# Patient Record
Sex: FEMALE | Race: WHITE
[De-identification: names, ages, dates, MRNs, and addresses within clinical notes are randomized per-mention and may not be internally consistent; named-entity substitution may affect disease eponyms.]

---
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
## 2018-05-23 PROBLEM — Z00.00 ENCOUNTER FOR PREVENTIVE HEALTH EXAMINATION: Status: ACTIVE | Noted: 2018-05-23

## 2018-05-29 ENCOUNTER — LABORATORY RESULT (OUTPATIENT)
Age: 23
End: 2018-05-29

## 2018-05-29 ENCOUNTER — APPOINTMENT (OUTPATIENT)
Dept: PEDIATRICS | Facility: CLINIC | Age: 23
End: 2018-05-29
Payer: COMMERCIAL

## 2018-05-29 ENCOUNTER — RECORD ABSTRACTING (OUTPATIENT)
Age: 23
End: 2018-05-29

## 2018-05-29 VITALS
SYSTOLIC BLOOD PRESSURE: 102 MMHG | RESPIRATION RATE: 18 BRPM | HEART RATE: 78 BPM | DIASTOLIC BLOOD PRESSURE: 68 MMHG | HEIGHT: 65.25 IN | WEIGHT: 136.25 LBS | BODY MASS INDEX: 22.43 KG/M2

## 2018-05-29 DIAGNOSIS — Z00.00 ENCOUNTER FOR GENERAL ADULT MEDICAL EXAMINATION W/OUT ABNORMAL FINDINGS: ICD-10-CM

## 2018-05-29 DIAGNOSIS — R80.9 PROTEINURIA, UNSPECIFIED: ICD-10-CM

## 2018-05-29 DIAGNOSIS — Z82.69 FAMILY HISTORY OF OTHER DISEASES OF THE MUSCULOSKELETAL SYSTEM AND CONNECTIVE TISSUE: ICD-10-CM

## 2018-05-29 DIAGNOSIS — Z87.19 PERSONAL HISTORY OF OTHER DISEASES OF THE DIGESTIVE SYSTEM: ICD-10-CM

## 2018-05-29 DIAGNOSIS — Z87.898 PERSONAL HISTORY OF OTHER SPECIFIED CONDITIONS: ICD-10-CM

## 2018-05-29 LAB
BILIRUB UR QL STRIP: NEGATIVE
CLARITY UR: CLEAR
COLLECTION METHOD: NORMAL
GLUCOSE UR-MCNC: NEGATIVE
HCG UR QL: 0.2 EU/DL
HGB UR QL STRIP.AUTO: NORMAL
KETONES UR-MCNC: NEGATIVE
LEUKOCYTE ESTERASE UR QL STRIP: NEGATIVE
NITRITE UR QL STRIP: NEGATIVE
PH UR STRIP: 6
PROT UR STRIP-MCNC: NEGATIVE
SP GR UR STRIP: <=1.005

## 2018-05-29 PROCEDURE — 99395 PREV VISIT EST AGE 18-39: CPT | Mod: 25

## 2018-05-29 PROCEDURE — 90472 IMMUNIZATION ADMIN EACH ADD: CPT

## 2018-05-29 PROCEDURE — 81003 URINALYSIS AUTO W/O SCOPE: CPT | Mod: QW

## 2018-05-29 PROCEDURE — 92551 PURE TONE HEARING TEST AIR: CPT

## 2018-05-29 PROCEDURE — 96127 BRIEF EMOTIONAL/BEHAV ASSMT: CPT

## 2018-05-29 PROCEDURE — 90471 IMMUNIZATION ADMIN: CPT

## 2018-05-29 PROCEDURE — 90715 TDAP VACCINE 7 YRS/> IM: CPT

## 2018-05-29 PROCEDURE — 90632 HEPA VACCINE ADULT IM: CPT

## 2018-05-29 NOTE — HISTORY OF PRESENT ILLNESS
[Good] : good [Good Dental Hygiene] : Good [Up to Date] : Up to date [No Nutrition Concerns] : nutrition [No Sleep Concerns] : sleep [No Behavior Concerns] : behavior [No School Concerns] : school [No Developmental Concerns] : development [No Elimination Concerns] : elimination [Diverse, Healthy Diet] : her current diet is diverse and healthy [None] : No significant risk factors are identified [Normal] : bleeding has been normal [Regular Cycle Intervals] : have been regular [Regular Duration] : has been regular

## 2018-05-29 NOTE — PHYSICAL EXAM
[General Appearance - Well Developed] : interactive [General Appearance - Well-Appearing] : well appearing [General Appearance - In No Acute Distress] : in no acute distress [Appearance Of Head] : the head was normocephalic [Sclera] : the conjunctiva were normal [Outer Ear] : the ears and nose were normal in appearance [Both Tympanic Membranes Were Examined] : both tympanic membranes were normal [Nasal Cavity] : the nasal mucosa and septum were normal [Examination Of The Oral Cavity] : the teeth, gums, and palate were normal [Oropharynx] : the oropharynx was normal  [Neck Cervical Mass (___cm)] : no neck mass was observed [Respiration, Rhythm And Depth] : normal respiratory rhythm and effort [Auscultation Breath Sounds / Voice Sounds] : clear bilateral breath sounds [Heart Rate And Rhythm] : heart rate and rhythm were normal [Heart Sounds] : normal S1 and S2 [Murmurs] : no murmurs [Bowel Sounds] : normal bowel sounds [Abdomen Soft] : soft [Abdomen Tenderness] : non-tender [Abdominal Distention] : nondistended [Musculoskeletal Exam: Normal Movement Of All Extremities] : normal movements of all extremities [Motor Tone] : muscle strength and tone were normal [No Visual Abnormalities] : no visible abnormailities [Deep Tendon Reflexes (DTR)] : deep tendon reflexes were 2+ and symmetric [Generalized Lymph Node Enlargement] : no lymphadenopathy [Skin Color & Pigmentation] : normal skin color and pigmentation [] : no significant rash [Skin Lesions] : no skin lesions [Initial Inspection: Infant Active And Alert] : active and alert [External Female Genitalia] : normal external genitalia

## 2018-05-30 LAB
ALBUMIN SERPL ELPH-MCNC: 4.4 G/DL
ALP BLD-CCNC: 61 U/L
ALT SERPL-CCNC: 13 U/L
ANION GAP SERPL CALC-SCNC: 12 MMOL/L
AST SERPL-CCNC: 17 U/L
BASOPHILS # BLD AUTO: 0.01 K/UL
BASOPHILS NFR BLD AUTO: 0.3 %
BILIRUB SERPL-MCNC: 0.4 MG/DL
BUN SERPL-MCNC: 10 MG/DL
CALCIUM SERPL-MCNC: 9.2 MG/DL
CHLORIDE SERPL-SCNC: 104 MMOL/L
CO2 SERPL-SCNC: 26 MMOL/L
CREAT SERPL-MCNC: 0.79 MG/DL
EOSINOPHIL # BLD AUTO: 0.09 K/UL
EOSINOPHIL NFR BLD AUTO: 2.3 %
GLUCOSE SERPL-MCNC: 84 MG/DL
HBA1C MFR BLD HPLC: 5 %
HCT VFR BLD CALC: 37 %
HGB BLD-MCNC: 12.6 G/DL
IMM GRANULOCYTES NFR BLD AUTO: 0 %
IRON SATN MFR SERPL: 26 %
IRON SERPL-MCNC: 84 UG/DL
LYMPHOCYTES # BLD AUTO: 1.28 K/UL
LYMPHOCYTES NFR BLD AUTO: 32.4 %
MAN DIFF?: NORMAL
MCHC RBC-ENTMCNC: 31.8 PG
MCHC RBC-ENTMCNC: 34.1 GM/DL
MCV RBC AUTO: 93.4 FL
MONOCYTES # BLD AUTO: 0.44 K/UL
MONOCYTES NFR BLD AUTO: 11.1 %
NEUTROPHILS # BLD AUTO: 2.13 K/UL
NEUTROPHILS NFR BLD AUTO: 53.9 %
PLATELET # BLD AUTO: 228 K/UL
POTASSIUM SERPL-SCNC: 4.5 MMOL/L
PROT SERPL-MCNC: 6.5 G/DL
RBC # BLD: 3.96 M/UL
RBC # FLD: 12.9 %
SODIUM SERPL-SCNC: 142 MMOL/L
T4 SERPL-MCNC: 6.6 UG/DL
TIBC SERPL-MCNC: 325 UG/DL
UIBC SERPL-MCNC: 241 UG/DL
VZV AB TITR SER: POSITIVE
VZV IGG SER IF-ACNC: 484.9 INDEX
WBC # FLD AUTO: 3.95 K/UL

## 2018-05-30 NOTE — DISCUSSION/SUMMARY
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

## 2018-05-30 NOTE — COUNSELING
[FreeTextEntry1] : Counseling: Alcohol and drugs:. Bike helmet:. Breast self exam after periods:. Communication to patient: Counseling for nutrition provided Yes, Counseling for physical activity provided Yes, Counseled the Patient on tobacco use; cessation provided  Diet:. Exercise:. Injury prevention:. Seatbelts:. Smoking:. Sunscreen\par Health Promotion: Oral health brush teeth - small amount of flouride toothpaste, dental appointment. Sexual health sexual development, change in body structure , safe sex , sexually transmitted disease education , use of condoms. Smoke exposure. Personal hygiene. Skin/UV protection. \par Injury Prevention/Safety: Bike safety. Car restraints and seat belts. Drugs/alcohol/tobacco. \par Nutrition Counseling: Healthy food choices no forced foods , family meals as often as possible, limit or eliminate junk food, adequate calcium , adequate iron containing foods. Weight management. \par \par Social/Behavioral Counseling: Praise good behavior. Relationships caution about peer pressure , advised to cultivate safe relationships , how to say no . TV/game time limit and control TV and game time . Violence Prevention: Gun Safety. Gang Membership and Violence. \par \par Vaccine information sheet given- Date on VIS verified. \par

## 2018-05-30 NOTE — DEVELOPMENTAL MILESTONES
[Eats meals with family] : eats meals with family [Has famliy member/adult to turn to for help] : has family member/adult to turn to for help [Is permitted and is able to make independent decisions] : is permitted and is able to make independent decisions [Mother] : mother [Brother] : brother [Sister] : sister [NL] : normal [Has friends] : has friends [At least 1 hour of physical acitvity/day] : at least 1 hour of physical activity/day [Sexually Active] : The patient is sexually active [[id number]] : 2 [Uses tobacco/alcohol/drugs] : does not use tobacco/alcohol/drugs [FreeTextEntry6] : third year of college

## 2022-08-05 ENCOUNTER — APPOINTMENT (OUTPATIENT)
Dept: ORTHOPEDIC SURGERY | Facility: CLINIC | Age: 27
End: 2022-08-05

## 2022-08-05 VITALS — HEIGHT: 65 IN | WEIGHT: 155 LBS | BODY MASS INDEX: 25.83 KG/M2

## 2022-08-05 DIAGNOSIS — M25.562 PAIN IN LEFT KNEE: ICD-10-CM

## 2022-08-05 DIAGNOSIS — M70.52 OTHER BURSITIS OF KNEE, LEFT KNEE: ICD-10-CM

## 2022-08-05 DIAGNOSIS — S83.92XA SPRAIN OF UNSPECIFIED SITE OF LEFT KNEE, INITIAL ENCOUNTER: ICD-10-CM

## 2022-08-05 PROCEDURE — 73564 X-RAY EXAM KNEE 4 OR MORE: CPT | Mod: LT

## 2022-08-05 PROCEDURE — 99203 OFFICE O/P NEW LOW 30 MIN: CPT

## 2022-08-05 NOTE — HISTORY OF PRESENT ILLNESS
[7] : 7 [2] : 2 [Dull/Aching] : dull/aching [Sharp] : sharp [de-identified] : Left knee pain after running and stepping off curb poorly. The day after she pivoted the knee, knee buckled and c/o of stiffness after.  c/o of rubbing and grinding pain, difficulty ascending pain. Tried Advil, prior swelling. Denies hx of left knee pain. c/o of locking symptom.Has GI ulcers but tried advil [FreeTextEntry1] : left knee.

## 2022-08-05 NOTE — DISCUSSION/SUMMARY
[de-identified] : ice\par restrict activities\par stop using advil\par  \par \par RE:  RENNY BEAVER \par \par Acct #- [id number] \par \par \par \par Attention:  Nurse Reviewer /Medical Director\par \par  \par Based on my patient's condition, I strongly believe that the MRI is medically.necessary.  \par The patient has failed oral meds, injections and PT and conservative treatment in combination or by themselves and therefore needs the MRI.  \par The MRI will dictate further treatment t recommendations.\par

## 2022-08-05 NOTE — PHYSICAL EXAM
[Positive] : positive Chau [Left] : left knee [All Views] : anteroposterior, lateral, skyline, and anteroposterior standing [] : no calf tenderness [5___] : quadriceps 5[unfilled]/5 [There are no fractures, subluxations or dislocations. No significant abnormalities are seen] : There are no fractures, subluxations or dislocations. No significant abnormalities are seen [TWNoteComboBox7] : flexion 130 degrees [de-identified] : extension 0 degrees

## 2022-08-26 ENCOUNTER — APPOINTMENT (OUTPATIENT)
Dept: ORTHOPEDIC SURGERY | Facility: CLINIC | Age: 27
End: 2022-08-26

## 2023-01-06 ENCOUNTER — NON-APPOINTMENT (OUTPATIENT)
Age: 28
End: 2023-01-06

## 2023-01-09 ENCOUNTER — APPOINTMENT (OUTPATIENT)
Dept: OBGYN | Facility: CLINIC | Age: 28
End: 2023-01-09
Payer: COMMERCIAL

## 2023-01-09 VITALS
WEIGHT: 163 LBS | BODY MASS INDEX: 27.16 KG/M2 | SYSTOLIC BLOOD PRESSURE: 118 MMHG | HEIGHT: 65 IN | DIASTOLIC BLOOD PRESSURE: 70 MMHG

## 2023-01-09 DIAGNOSIS — Z78.9 OTHER SPECIFIED HEALTH STATUS: ICD-10-CM

## 2023-01-09 DIAGNOSIS — N92.0 EXCESSIVE AND FREQUENT MENSTRUATION WITH REGULAR CYCLE: ICD-10-CM

## 2023-01-09 DIAGNOSIS — Z01.411 ENCOUNTER FOR GYNECOLOGICAL EXAMINATION (GENERAL) (ROUTINE) WITH ABNORMAL FINDINGS: ICD-10-CM

## 2023-01-09 DIAGNOSIS — K27.9 PEPTIC ULCER, SITE UNSPECIFIED, UNSPECIFIED AS ACUTE OR CHRONIC, W/OUT HEMORRHAGE OR PERFORATION: ICD-10-CM

## 2023-01-09 PROCEDURE — 99385 PREV VISIT NEW AGE 18-39: CPT

## 2023-01-09 RX ORDER — CYCLOBENZAPRINE HYDROCHLORIDE 5 MG/1
5 TABLET, FILM COATED ORAL
Qty: 21 | Refills: 0 | Status: DISCONTINUED | COMMUNITY
Start: 2022-07-13 | End: 2023-01-09

## 2023-01-09 RX ORDER — OMEPRAZOLE 40 MG/1
40 CAPSULE, DELAYED RELEASE ORAL
Qty: 42 | Refills: 0 | Status: ACTIVE | COMMUNITY
Start: 2022-12-08

## 2023-01-09 NOTE — HISTORY OF PRESENT ILLNESS
[N] : Patient does not use contraception [Y] : Patient is sexually active [Menarche Age: ____] : age at menarche was [unfilled] [Frequency: Q ___ days] : menstrual periods occur approximately every [unfilled] days [LMPDate] : 12/27/22 [PGHxTotal] : 0 [PGHxFullTerm] : 0 [PGHxPremature] : 0 [PGHxAbortions] : 0 [[facility]] : 0 [PGHxABInduced] : 0 [PGHxABSpont] : 0 [PGHxEctopic] : 0 [PGHxMultBirths] : 0 [FreeTextEntry1] : 12/27/22

## 2023-01-09 NOTE — COUNSELING
[Nutrition/ Exercise/ Weight Management] : nutrition, exercise, weight management [Body Image] : body image [Breast Self Exam] : breast self exam [Bladder Hygiene] : bladder hygiene [Vitamins/Supplements] : vitamins/supplements

## 2023-01-09 NOTE — PLAN
[FreeTextEntry1] : Encounter for GYN exam \par \par - Pap obtained \par - Further STD testing offered and declined \par \par Menorrhagia \par \par - Labs obtained \par - US ordered \par  \par Will call patient with results \par All questions and concerns addressed during encounter. Pt. agreed to plan of care. \par F.U in 1 year or PRN

## 2023-01-10 ENCOUNTER — APPOINTMENT (OUTPATIENT)
Dept: ULTRASOUND IMAGING | Facility: CLINIC | Age: 28
End: 2023-01-10
Payer: COMMERCIAL

## 2023-01-10 ENCOUNTER — OUTPATIENT (OUTPATIENT)
Dept: OUTPATIENT SERVICES | Facility: HOSPITAL | Age: 28
LOS: 1 days | End: 2023-01-10
Payer: COMMERCIAL

## 2023-01-10 DIAGNOSIS — N92.0 EXCESSIVE AND FREQUENT MENSTRUATION WITH REGULAR CYCLE: ICD-10-CM

## 2023-01-10 LAB
DHEA-S SERPL-MCNC: 259 UG/DL
ESTIMATED AVERAGE GLUCOSE: 103 MG/DL
ESTRADIOL SERPL-MCNC: 82 PG/ML
FSH SERPL-MCNC: 7.4 IU/L
HBA1C MFR BLD HPLC: 5.2 %
HCG-TM SERPL-MCNC: <1 MIU/ML
HPV HIGH+LOW RISK DNA PNL CVX: NOT DETECTED
LH SERPL-ACNC: 24.5 IU/L
PROLACTIN SERPL-MCNC: 30.6 NG/ML
TSH SERPL-ACNC: 1.35 UIU/ML

## 2023-01-10 PROCEDURE — 76856 US EXAM PELVIC COMPLETE: CPT

## 2023-01-10 PROCEDURE — 76830 TRANSVAGINAL US NON-OB: CPT | Mod: 26

## 2023-01-10 PROCEDURE — 76830 TRANSVAGINAL US NON-OB: CPT

## 2023-01-10 PROCEDURE — 76856 US EXAM PELVIC COMPLETE: CPT | Mod: 26

## 2023-01-11 ENCOUNTER — TRANSCRIPTION ENCOUNTER (OUTPATIENT)
Age: 28
End: 2023-01-11

## 2023-01-16 LAB
17OHP SERPL-MCNC: 75 NG/DL
TESTOST FREE SERPL-MCNC: 2.6 PG/ML
TESTOST SERPL-MCNC: 26.6 NG/DL

## 2023-01-16 RX ORDER — NORETHINDRONE ACETATE AND ETHINYL ESTRADIOL AND FERROUS FUMARATE 1MG-20(21)
1-20 KIT ORAL DAILY
Qty: 3 | Refills: 1 | Status: ACTIVE | COMMUNITY
Start: 2023-01-16 | End: 1900-01-01

## 2023-01-20 LAB — CYTOLOGY CVX/VAG DOC THIN PREP: ABNORMAL

## 2023-01-24 ENCOUNTER — APPOINTMENT (OUTPATIENT)
Dept: OBGYN | Facility: CLINIC | Age: 28
End: 2023-01-24

## 2023-10-09 ENCOUNTER — APPOINTMENT (OUTPATIENT)
Dept: HUMAN REPRODUCTION | Facility: CLINIC | Age: 28
End: 2023-10-09

## 2023-10-09 ENCOUNTER — APPOINTMENT (OUTPATIENT)
Dept: HUMAN REPRODUCTION | Facility: CLINIC | Age: 28
End: 2023-10-09
Payer: COMMERCIAL

## 2023-10-09 PROCEDURE — 36415 COLL VENOUS BLD VENIPUNCTURE: CPT

## 2023-10-09 PROCEDURE — 76830 TRANSVAGINAL US NON-OB: CPT

## 2023-10-09 PROCEDURE — 99205 OFFICE O/P NEW HI 60 MIN: CPT | Mod: 25

## 2023-12-08 ENCOUNTER — APPOINTMENT (OUTPATIENT)
Dept: HUMAN REPRODUCTION | Facility: CLINIC | Age: 28
End: 2023-12-08
Payer: COMMERCIAL

## 2023-12-08 PROCEDURE — 36415 COLL VENOUS BLD VENIPUNCTURE: CPT

## 2023-12-11 ENCOUNTER — APPOINTMENT (OUTPATIENT)
Dept: HUMAN REPRODUCTION | Facility: CLINIC | Age: 28
End: 2023-12-11
Payer: COMMERCIAL

## 2023-12-11 PROCEDURE — 58340 CATHETER FOR HYSTEROGRAPHY: CPT

## 2023-12-11 PROCEDURE — 76831 ECHO EXAM UTERUS: CPT

## 2023-12-11 PROCEDURE — 58999I: CUSTOM

## 2023-12-11 PROCEDURE — 99214 OFFICE O/P EST MOD 30 MIN: CPT | Mod: 25

## 2023-12-29 ENCOUNTER — APPOINTMENT (OUTPATIENT)
Dept: HUMAN REPRODUCTION | Facility: CLINIC | Age: 28
End: 2023-12-29

## 2024-01-04 ENCOUNTER — APPOINTMENT (OUTPATIENT)
Dept: HUMAN REPRODUCTION | Facility: CLINIC | Age: 29
End: 2024-01-04
Payer: COMMERCIAL

## 2024-01-04 PROCEDURE — 99214 OFFICE O/P EST MOD 30 MIN: CPT | Mod: 95

## 2024-01-08 ENCOUNTER — APPOINTMENT (OUTPATIENT)
Dept: HUMAN REPRODUCTION | Facility: CLINIC | Age: 29
End: 2024-01-08

## 2024-01-18 ENCOUNTER — APPOINTMENT (OUTPATIENT)
Dept: HUMAN REPRODUCTION | Facility: CLINIC | Age: 29
End: 2024-01-18
Payer: COMMERCIAL

## 2024-01-18 PROCEDURE — 36415 COLL VENOUS BLD VENIPUNCTURE: CPT

## 2024-01-18 PROCEDURE — 76830 TRANSVAGINAL US NON-OB: CPT

## 2024-01-18 PROCEDURE — 99213 OFFICE O/P EST LOW 20 MIN: CPT | Mod: 25

## 2024-01-31 ENCOUNTER — APPOINTMENT (OUTPATIENT)
Dept: HUMAN REPRODUCTION | Facility: CLINIC | Age: 29
End: 2024-01-31
Payer: COMMERCIAL

## 2024-01-31 PROCEDURE — 99213 OFFICE O/P EST LOW 20 MIN: CPT | Mod: 25

## 2024-01-31 PROCEDURE — 76830 TRANSVAGINAL US NON-OB: CPT

## 2024-01-31 PROCEDURE — 36415 COLL VENOUS BLD VENIPUNCTURE: CPT

## 2024-01-31 PROCEDURE — S4042: CPT

## 2024-02-03 ENCOUNTER — APPOINTMENT (OUTPATIENT)
Dept: HUMAN REPRODUCTION | Facility: CLINIC | Age: 29
End: 2024-02-03
Payer: COMMERCIAL

## 2024-02-03 PROCEDURE — 99213 OFFICE O/P EST LOW 20 MIN: CPT | Mod: 25

## 2024-02-03 PROCEDURE — 76857 US EXAM PELVIC LIMITED: CPT

## 2024-02-03 PROCEDURE — 36415 COLL VENOUS BLD VENIPUNCTURE: CPT

## 2024-02-05 ENCOUNTER — APPOINTMENT (OUTPATIENT)
Dept: HUMAN REPRODUCTION | Facility: CLINIC | Age: 29
End: 2024-02-05
Payer: COMMERCIAL

## 2024-02-05 PROCEDURE — 36415 COLL VENOUS BLD VENIPUNCTURE: CPT

## 2024-02-05 PROCEDURE — 76857 US EXAM PELVIC LIMITED: CPT

## 2024-02-05 PROCEDURE — 99213 OFFICE O/P EST LOW 20 MIN: CPT | Mod: 25

## 2024-02-07 ENCOUNTER — APPOINTMENT (OUTPATIENT)
Dept: HUMAN REPRODUCTION | Facility: CLINIC | Age: 29
End: 2024-02-07
Payer: COMMERCIAL

## 2024-02-07 PROCEDURE — 99213 OFFICE O/P EST LOW 20 MIN: CPT | Mod: 25

## 2024-02-07 PROCEDURE — 76857 US EXAM PELVIC LIMITED: CPT

## 2024-02-07 PROCEDURE — 36415 COLL VENOUS BLD VENIPUNCTURE: CPT

## 2024-02-09 ENCOUNTER — APPOINTMENT (OUTPATIENT)
Dept: HUMAN REPRODUCTION | Facility: CLINIC | Age: 29
End: 2024-02-09
Payer: COMMERCIAL

## 2024-02-09 PROCEDURE — 36415 COLL VENOUS BLD VENIPUNCTURE: CPT

## 2024-02-09 PROCEDURE — 99213 OFFICE O/P EST LOW 20 MIN: CPT | Mod: 25

## 2024-02-09 PROCEDURE — 76857 US EXAM PELVIC LIMITED: CPT

## 2024-02-10 ENCOUNTER — APPOINTMENT (OUTPATIENT)
Dept: HUMAN REPRODUCTION | Facility: CLINIC | Age: 29
End: 2024-02-10
Payer: COMMERCIAL

## 2024-02-10 PROCEDURE — 99213 OFFICE O/P EST LOW 20 MIN: CPT | Mod: 25

## 2024-02-10 PROCEDURE — 36415 COLL VENOUS BLD VENIPUNCTURE: CPT

## 2024-02-10 PROCEDURE — 76857 US EXAM PELVIC LIMITED: CPT

## 2024-02-11 ENCOUNTER — APPOINTMENT (OUTPATIENT)
Dept: HUMAN REPRODUCTION | Facility: CLINIC | Age: 29
End: 2024-02-11
Payer: COMMERCIAL

## 2024-02-11 PROCEDURE — 99213 OFFICE O/P EST LOW 20 MIN: CPT | Mod: 25

## 2024-02-11 PROCEDURE — 76857 US EXAM PELVIC LIMITED: CPT

## 2024-02-11 PROCEDURE — 36415 COLL VENOUS BLD VENIPUNCTURE: CPT

## 2024-02-12 ENCOUNTER — APPOINTMENT (OUTPATIENT)
Dept: HUMAN REPRODUCTION | Facility: CLINIC | Age: 29
End: 2024-02-12
Payer: COMMERCIAL

## 2024-02-12 PROCEDURE — 36415 COLL VENOUS BLD VENIPUNCTURE: CPT

## 2024-02-13 ENCOUNTER — APPOINTMENT (OUTPATIENT)
Dept: HUMAN REPRODUCTION | Facility: CLINIC | Age: 29
End: 2024-02-13
Payer: COMMERCIAL

## 2024-02-13 PROCEDURE — 89260 SPERM ISOLATION SIMPLE: CPT

## 2024-02-13 PROCEDURE — 76948 ECHO GUIDE OVA ASPIRATION: CPT

## 2024-02-13 PROCEDURE — 89281 ASSIST OOCYTE FERTILIZATION: CPT

## 2024-02-13 PROCEDURE — 58970 RETRIEVAL OF OOCYTE: CPT

## 2024-02-13 PROCEDURE — 89250 CULTR OOCYTE/EMBRYO <4 DAYS: CPT

## 2024-02-13 PROCEDURE — 89353 THAWING CRYOPRESRVED SPERM: CPT

## 2024-02-13 PROCEDURE — 89254 OOCYTE IDENTIFICATION: CPT

## 2024-02-14 ENCOUNTER — APPOINTMENT (OUTPATIENT)
Dept: HUMAN REPRODUCTION | Facility: CLINIC | Age: 29
End: 2024-02-14
Payer: COMMERCIAL

## 2024-02-16 PROCEDURE — 89253 EMBRYO HATCHING: CPT

## 2024-02-18 PROCEDURE — 89342 STORAGE/YEAR EMBRYO(S): CPT

## 2024-02-18 PROCEDURE — 89258 CRYOPRESERVATION EMBRYO(S): CPT

## 2024-02-18 PROCEDURE — 89290 BIOPSY OOCYTE POLAR BODY <=5: CPT

## 2024-02-18 PROCEDURE — 89272 EXTENDED CULTURE OF OOCYTES: CPT

## 2024-02-19 PROCEDURE — 89258 CRYOPRESERVATION EMBRYO(S): CPT

## 2024-02-26 ENCOUNTER — APPOINTMENT (OUTPATIENT)
Dept: HUMAN REPRODUCTION | Facility: CLINIC | Age: 29
End: 2024-02-26
Payer: COMMERCIAL

## 2024-02-26 PROCEDURE — 76830 TRANSVAGINAL US NON-OB: CPT

## 2024-02-26 PROCEDURE — 36415 COLL VENOUS BLD VENIPUNCTURE: CPT

## 2024-02-26 PROCEDURE — 99213 OFFICE O/P EST LOW 20 MIN: CPT | Mod: 25

## 2024-03-12 ENCOUNTER — APPOINTMENT (OUTPATIENT)
Dept: HUMAN REPRODUCTION | Facility: CLINIC | Age: 29
End: 2024-03-12
Payer: COMMERCIAL

## 2024-03-12 PROCEDURE — 99213 OFFICE O/P EST LOW 20 MIN: CPT | Mod: 25

## 2024-03-12 PROCEDURE — 36415 COLL VENOUS BLD VENIPUNCTURE: CPT

## 2024-03-12 PROCEDURE — 76830 TRANSVAGINAL US NON-OB: CPT

## 2024-03-12 PROCEDURE — S4042: CPT

## 2024-03-21 ENCOUNTER — APPOINTMENT (OUTPATIENT)
Dept: HUMAN REPRODUCTION | Facility: CLINIC | Age: 29
End: 2024-03-21
Payer: COMMERCIAL

## 2024-03-21 PROCEDURE — 36415 COLL VENOUS BLD VENIPUNCTURE: CPT

## 2024-03-21 PROCEDURE — 99213 OFFICE O/P EST LOW 20 MIN: CPT | Mod: 25

## 2024-03-21 PROCEDURE — 76857 US EXAM PELVIC LIMITED: CPT

## 2024-03-28 ENCOUNTER — APPOINTMENT (OUTPATIENT)
Dept: HUMAN REPRODUCTION | Facility: CLINIC | Age: 29
End: 2024-03-28
Payer: COMMERCIAL

## 2024-03-28 PROCEDURE — 36415 COLL VENOUS BLD VENIPUNCTURE: CPT

## 2024-03-28 PROCEDURE — 99213 OFFICE O/P EST LOW 20 MIN: CPT | Mod: 25

## 2024-03-28 PROCEDURE — 76857 US EXAM PELVIC LIMITED: CPT

## 2024-04-03 ENCOUNTER — APPOINTMENT (OUTPATIENT)
Dept: HUMAN REPRODUCTION | Facility: CLINIC | Age: 29
End: 2024-04-03
Payer: COMMERCIAL

## 2024-04-03 PROCEDURE — 58974 EMBRYO TRANSFER INTRAUTERINE: CPT

## 2024-04-03 PROCEDURE — 89352 THAWING CRYOPRESRVED EMBRYO: CPT

## 2024-04-03 PROCEDURE — 76998 US GUIDE INTRAOP: CPT

## 2024-04-03 PROCEDURE — 89398A: CUSTOM

## 2024-04-03 PROCEDURE — 89255 PREPARE EMBRYO FOR TRANSFER: CPT

## 2024-04-04 ENCOUNTER — APPOINTMENT (OUTPATIENT)
Dept: HUMAN REPRODUCTION | Facility: CLINIC | Age: 29
End: 2024-04-04
Payer: COMMERCIAL

## 2024-04-15 ENCOUNTER — APPOINTMENT (OUTPATIENT)
Dept: HUMAN REPRODUCTION | Facility: CLINIC | Age: 29
End: 2024-04-15
Payer: COMMERCIAL

## 2024-04-15 PROCEDURE — 36415 COLL VENOUS BLD VENIPUNCTURE: CPT

## 2024-04-17 ENCOUNTER — APPOINTMENT (OUTPATIENT)
Dept: HUMAN REPRODUCTION | Facility: CLINIC | Age: 29
End: 2024-04-17
Payer: COMMERCIAL

## 2024-04-17 PROCEDURE — 36415 COLL VENOUS BLD VENIPUNCTURE: CPT

## 2024-04-22 ENCOUNTER — APPOINTMENT (OUTPATIENT)
Dept: HUMAN REPRODUCTION | Facility: CLINIC | Age: 29
End: 2024-04-22
Payer: COMMERCIAL

## 2024-04-22 PROCEDURE — 76817 TRANSVAGINAL US OBSTETRIC: CPT

## 2024-04-22 PROCEDURE — 99459 PELVIC EXAMINATION: CPT | Mod: 25

## 2024-04-22 PROCEDURE — 99213 OFFICE O/P EST LOW 20 MIN: CPT | Mod: 25

## 2024-04-22 PROCEDURE — 36415 COLL VENOUS BLD VENIPUNCTURE: CPT

## 2024-05-03 ENCOUNTER — APPOINTMENT (OUTPATIENT)
Dept: HUMAN REPRODUCTION | Facility: CLINIC | Age: 29
End: 2024-05-03
Payer: COMMERCIAL

## 2024-05-03 PROCEDURE — 76817 TRANSVAGINAL US OBSTETRIC: CPT

## 2024-05-03 PROCEDURE — 99459 PELVIC EXAMINATION: CPT

## 2024-05-03 PROCEDURE — 36415 COLL VENOUS BLD VENIPUNCTURE: CPT

## 2024-05-03 PROCEDURE — 99213 OFFICE O/P EST LOW 20 MIN: CPT | Mod: 25

## 2024-05-15 DIAGNOSIS — Z13.71 ENCOUNTER FOR NONPROCREATIVE SCREENING FOR GENETIC DISEASE CARRIER STATUS: ICD-10-CM

## 2024-05-23 ENCOUNTER — NON-APPOINTMENT (OUTPATIENT)
Age: 29
End: 2024-05-23

## 2024-05-23 ENCOUNTER — LABORATORY RESULT (OUTPATIENT)
Age: 29
End: 2024-05-23

## 2024-05-24 ENCOUNTER — APPOINTMENT (OUTPATIENT)
Dept: OBGYN | Facility: CLINIC | Age: 29
End: 2024-05-24
Payer: COMMERCIAL

## 2024-05-24 VITALS
DIASTOLIC BLOOD PRESSURE: 70 MMHG | HEIGHT: 65 IN | SYSTOLIC BLOOD PRESSURE: 108 MMHG | WEIGHT: 171 LBS | BODY MASS INDEX: 28.49 KG/M2

## 2024-05-24 DIAGNOSIS — Z34.91 ENCOUNTER FOR SUPERVISION OF NORMAL PREGNANCY, UNSPECIFIED, FIRST TRIMESTER: ICD-10-CM

## 2024-05-24 LAB
APPEARANCE: CLEAR
BILIRUBIN URINE: NEGATIVE
BLOOD URINE: ABNORMAL
COLOR: YELLOW
GLUCOSE QUALITATIVE U: NEGATIVE
KETONES URINE: NEGATIVE
LEUKOCYTE ESTERASE URINE: ABNORMAL
NITRITE URINE: NEGATIVE
PH URINE: 6
PROTEIN URINE: NEGATIVE
SPECIFIC GRAVITY URINE: 1.01
UROBILINOGEN URINE: 0.2 (ref 0.2–?)

## 2024-05-24 PROCEDURE — 0500F INITIAL PRENATAL CARE VISIT: CPT

## 2024-05-24 PROCEDURE — 99459 PELVIC EXAMINATION: CPT

## 2024-05-24 PROCEDURE — 99203 OFFICE O/P NEW LOW 30 MIN: CPT

## 2024-05-28 PROBLEM — Z34.91 FIRST TRIMESTER PREGNANCY: Status: ACTIVE | Noted: 2024-05-15

## 2024-05-28 LAB
ABO + RH PNL BLD: NORMAL
ALBUMIN SERPL ELPH-MCNC: 4.2 G/DL
ALP BLD-CCNC: 65 U/L
ALT SERPL-CCNC: 11 U/L
ANION GAP SERPL CALC-SCNC: 20 MMOL/L
APPEARANCE: CLEAR
AST SERPL-CCNC: 14 U/L
BILIRUB SERPL-MCNC: 0.2 MG/DL
BILIRUBIN URINE: NEGATIVE
BLD GP AB SCN SERPL QL: NORMAL
BLOOD URINE: NEGATIVE
BUN SERPL-MCNC: 10 MG/DL
CALCIUM SERPL-MCNC: 9.3 MG/DL
CHLORIDE SERPL-SCNC: 98 MMOL/L
CMV IGG SERPL QL: <0.2 U/ML
CMV IGG SERPL-IMP: NEGATIVE
CMV IGM SERPL QL: <8 AU/ML
CMV IGM SERPL QL: NEGATIVE
CO2 SERPL-SCNC: 20 MMOL/L
COLOR: YELLOW
CREAT SERPL-MCNC: 0.66 MG/DL
EGFR: 124 ML/MIN/1.73M2
ESTIMATED AVERAGE GLUCOSE: 103 MG/DL
GLUCOSE QUALITATIVE U: NEGATIVE MG/DL
GLUCOSE SERPL-MCNC: 25 MG/DL
HBA1C MFR BLD HPLC: 5.2 %
HBV SURFACE AG SER QL: NONREACTIVE
HCT VFR BLD CALC: 40.5 %
HCV AB SER QL: NONREACTIVE
HCV S/CO RATIO: 0.07 S/CO
HGB A MFR BLD: 97.4 %
HGB A2 MFR BLD: 2.6 %
HGB BLD-MCNC: 13.9 G/DL
HGB FRACT BLD-IMP: NORMAL
HIV1+2 AB SPEC QL IA.RAPID: NONREACTIVE
KETONES URINE: NEGATIVE MG/DL
LEUKOCYTE ESTERASE URINE: ABNORMAL
MCHC RBC-ENTMCNC: 32.4 PG
MCHC RBC-ENTMCNC: 34.3 GM/DL
MCV RBC AUTO: 94.4 FL
MEV IGG FLD QL IA: >300 AU/ML
MEV IGG+IGM SER-IMP: POSITIVE
MUV AB SER-ACNC: POSITIVE
MUV IGG SER QL IA: >300 AU/ML
NITRITE URINE: NEGATIVE
PH URINE: 6.5
PLATELET # BLD AUTO: 222 K/UL
POTASSIUM SERPL-SCNC: 3.7 MMOL/L
PROT SERPL-MCNC: 6.3 G/DL
PROTEIN URINE: NEGATIVE MG/DL
RBC # BLD: 4.29 M/UL
RBC # FLD: 12.4 %
RUBV IGG FLD-ACNC: 6.1 INDEX
RUBV IGG SER-IMP: POSITIVE
SODIUM SERPL-SCNC: 138 MMOL/L
SPECIFIC GRAVITY URINE: 1.01
T GONDII AB SER-IMP: NEGATIVE
T GONDII AB SER-IMP: NEGATIVE
T GONDII IGG SER QL: <3 IU/ML
T GONDII IGM SER QL: <3 AU/ML
T PALLIDUM AB SER QL IA: NEGATIVE
TSH SERPL-ACNC: 1.21 UIU/ML
UROBILINOGEN URINE: 0.2 MG/DL
VZV AB TITR SER: POSITIVE
VZV IGG SER IF-ACNC: 713.5 INDEX
WBC # FLD AUTO: 9.57 K/UL

## 2024-05-28 NOTE — PHYSICAL EXAM
[Chaperone Present] : A chaperone was present in the examining room during all aspects of the physical examination [[id number]] : A chaperone was present during the pelvic exam. [Appropriately responsive] : appropriately responsive [Alert] : alert [No Acute Distress] : no acute distress [Soft] : soft [No Lesions] : no lesions

## 2024-05-28 NOTE — HISTORY OF PRESENT ILLNESS
[FreeTextEntry1] : 28 y.o  (LMP 3/15/24) presenting to [facility] OB care  Patient transferring care from [facility] (Dr. Sanchez).  - Underwent FET on 4/3/24, US On  measured 5w3d - Anonymous sperm donor- negative genetic testing. 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 Patient with Negative carrier screening - Currently in same-sex relationship with female partner. Most recent STD screening 10/2023-negative - Denies any vaginal bleeding or pelvic cramping pain  Screening: - Pap (): UNSATISFACTORY FOR EVAL   ObHx: denies GynHx: Denies hx of ovarian cysts, hx of fibroids, hx of endometriosis, hx of STD's PMH: Gastric ulcer PSH: History of Fiberoptic Examinations Gastroscopy ALL: NKDA SocHx: Lives with Partner. Feels safe at home. Denies depression or anxiety related symptoms. Never used tobacco products, denies illicit drug use/EtOH. FamHx: no significant family history of GYN malignancy or disease

## 2024-05-28 NOTE — COUNSELING
[Nutrition/ Exercise/ Weight Management] : nutrition, exercise, weight management [Body Image] : body image [Vitamins/Supplements] : vitamins/supplements [Confidentiality] : confidentiality [Lab Results] : lab results

## 2024-05-28 NOTE — PROCEDURE
[Transabdominal OB Sonogram] : Transabdominal OB Sonogram [Intrauterine Pregnancy] : intrauterine pregnancy [Fetal Heart] : fetal heart present

## 2024-05-28 NOTE — DISCUSSION/SUMMARY
[FreeTextEntry1] : 28 y.o  (LMP 3/15/24) presenting to establish OB care  Plan: - IUP confirmed, subchorionic hematoma noted. Asymptomatic at bedside. Discussed pelvic rest, minimizing heavy lifting and monitoring for vaginal bleeding/pelvic cramping. - Patient states that she has plans to travel in the next week to [place]. Discussed travel considerations and close monitoring. Verbalized understanding - Denies any cramping/pelvic pain, abnormal vaginal discharge, vaginal bleeding, vaginal pain/irritation/itching, or burning/pain with urination. - Prenatal lab work testing/screening (anemia, hypothyroidism, immunological panel, and genetic screening), STD screening and urine culture discussed. - Patient denies any objections to blood products in the future in case of medical indication for transfusion. - Discussed first trimester aneuploidy screen/NT/NIPT and second trimester screening. Discussed possible out-of-pocket costs associated with NIPT.  - No family history of genetic/metabolic disorders or developmental/cognitive delays. Discussed risks/benefits/alternatives to all above screenings and appropriate timing/follow up for each.  - Counseled on prenatal, breast feeding and hospital related courses/packet.  - Oriented to practice and discussed expectations for in-office prenatal care as well as labor & delivery care. - Counseled on balanced diet, adequate hydration, PNV, activity level, and first trimester expectations of pregnancy. - Discussed heartburn/GERD and nausea/vomiting expectations in early pregnancy. - Given counseling about lifestyle/dietary modification and OTC recommendations (Vitamin B6/Doxylamine; TUMS/Pepcid). - Ultrascreen ordered. RTO in 3 weeks

## 2024-05-29 ENCOUNTER — NON-APPOINTMENT (OUTPATIENT)
Age: 29
End: 2024-05-29

## 2024-06-01 LAB
AR GENE MUT ANL BLD/T: NORMAL
B19V IGG SER QL IA: 2.07 INDEX
B19V IGG+IGM SER-IMP: NORMAL
B19V IGG+IGM SER-IMP: POSITIVE
B19V IGM FLD-ACNC: 0.15 INDEX
B19V IGM SER-ACNC: NEGATIVE
CFTR MUT TESTED BLD/T: NEGATIVE
FMR1 GENE MUT ANL BLD/T: NORMAL
LEAD BLD-MCNC: <1 UG/DL
M TB IFN-G BLD-IMP: NEGATIVE
QUANTIFERON TB PLUS MITOGEN MINUS NIL: >10 IU/ML
QUANTIFERON TB PLUS NIL: 0.03 IU/ML
QUANTIFERON TB PLUS TB1 MINUS NIL: 0 IU/ML
QUANTIFERON TB PLUS TB2 MINUS NIL: 0 IU/ML

## 2024-06-05 ENCOUNTER — NON-APPOINTMENT (OUTPATIENT)
Age: 29
End: 2024-06-05

## 2024-06-14 ENCOUNTER — APPOINTMENT (OUTPATIENT)
Dept: OBGYN | Facility: CLINIC | Age: 29
End: 2024-06-14
Payer: COMMERCIAL

## 2024-06-14 VITALS
SYSTOLIC BLOOD PRESSURE: 102 MMHG | WEIGHT: 167.5 LBS | HEIGHT: 65 IN | DIASTOLIC BLOOD PRESSURE: 60 MMHG | BODY MASS INDEX: 27.91 KG/M2

## 2024-06-14 DIAGNOSIS — Z12.4 ENCOUNTER FOR SCREENING FOR MALIGNANT NEOPLASM OF CERVIX: ICD-10-CM

## 2024-06-14 PROCEDURE — 0500F INITIAL PRENATAL CARE VISIT: CPT

## 2024-06-15 LAB
APPEARANCE: CLEAR
BILIRUBIN URINE: NEGATIVE
BLOOD URINE: ABNORMAL
COLOR: YELLOW
GLUCOSE QUALITATIVE U: NEGATIVE
KETONES URINE: NEGATIVE
LEUKOCYTE ESTERASE URINE: NEGATIVE
NITRITE URINE: NEGATIVE
PH URINE: 5.5
PROTEIN URINE: NEGATIVE
SPECIFIC GRAVITY URINE: >=1.03
UROBILINOGEN URINE: 0.2 (ref 0.2–?)

## 2024-06-18 ENCOUNTER — ASOB RESULT (OUTPATIENT)
Age: 29
End: 2024-06-18

## 2024-06-18 ENCOUNTER — APPOINTMENT (OUTPATIENT)
Dept: ANTEPARTUM | Facility: CLINIC | Age: 29
End: 2024-06-18
Payer: COMMERCIAL

## 2024-06-18 PROCEDURE — 76801 OB US < 14 WKS SINGLE FETUS: CPT

## 2024-06-26 ENCOUNTER — NON-APPOINTMENT (OUTPATIENT)
Age: 29
End: 2024-06-26

## 2024-06-26 DIAGNOSIS — Z34.92 ENCOUNTER FOR SUPERVISION OF NORMAL PREGNANCY, UNSPECIFIED, SECOND TRIMESTER: ICD-10-CM

## 2024-07-05 ENCOUNTER — APPOINTMENT (OUTPATIENT)
Dept: OBGYN | Facility: CLINIC | Age: 29
End: 2024-07-05
Payer: COMMERCIAL

## 2024-07-05 VITALS
HEIGHT: 65 IN | SYSTOLIC BLOOD PRESSURE: 102 MMHG | DIASTOLIC BLOOD PRESSURE: 62 MMHG | WEIGHT: 166.38 LBS | BODY MASS INDEX: 27.72 KG/M2

## 2024-07-05 PROCEDURE — 0502F SUBSEQUENT PRENATAL CARE: CPT

## 2024-07-09 LAB
APPEARANCE: CLEAR
BILIRUBIN URINE: NEGATIVE
BLOOD URINE: NEGATIVE
COLOR: YELLOW
GLUCOSE QUALITATIVE U: NEGATIVE
KETONES URINE: NEGATIVE
LEUKOCYTE ESTERASE URINE: NEGATIVE
NITRITE URINE: NEGATIVE
PH URINE: 6
PROTEIN URINE: NEGATIVE
SPECIFIC GRAVITY URINE: <=1.005
UROBILINOGEN URINE: 0.2 (ref 0.2–?)

## 2024-07-10 ENCOUNTER — APPOINTMENT (OUTPATIENT)
Dept: OBGYN | Facility: CLINIC | Age: 29
End: 2024-07-10
Payer: COMMERCIAL

## 2024-07-10 ENCOUNTER — ASOB RESULT (OUTPATIENT)
Age: 29
End: 2024-07-10

## 2024-07-10 ENCOUNTER — APPOINTMENT (OUTPATIENT)
Dept: ANTEPARTUM | Facility: CLINIC | Age: 29
End: 2024-07-10
Payer: COMMERCIAL

## 2024-07-10 ENCOUNTER — NON-APPOINTMENT (OUTPATIENT)
Age: 29
End: 2024-07-10

## 2024-07-10 VITALS
BODY MASS INDEX: 27.99 KG/M2 | SYSTOLIC BLOOD PRESSURE: 122 MMHG | WEIGHT: 168 LBS | DIASTOLIC BLOOD PRESSURE: 70 MMHG | HEIGHT: 65 IN

## 2024-07-10 LAB
APPEARANCE: CLEAR
BILIRUBIN URINE: NEGATIVE
BLOOD URINE: NEGATIVE
COLOR: YELLOW
GLUCOSE QUALITATIVE U: NEGATIVE
KETONES URINE: ABNORMAL
LEUKOCYTE ESTERASE URINE: NEGATIVE
NITRITE URINE: NEGATIVE
PH URINE: 6.5
PROTEIN URINE: NEGATIVE
SPECIFIC GRAVITY URINE: 1.02
UROBILINOGEN URINE: 0.2 (ref 0.2–?)

## 2024-07-10 PROCEDURE — 76815 OB US LIMITED FETUS(S): CPT

## 2024-07-10 PROCEDURE — 81003 URINALYSIS AUTO W/O SCOPE: CPT | Mod: NC,QW

## 2024-07-10 PROCEDURE — 0502F SUBSEQUENT PRENATAL CARE: CPT

## 2024-08-05 ENCOUNTER — NON-APPOINTMENT (OUTPATIENT)
Age: 29
End: 2024-08-05

## 2024-08-05 ENCOUNTER — APPOINTMENT (OUTPATIENT)
Dept: OBGYN | Facility: CLINIC | Age: 29
End: 2024-08-05

## 2024-08-05 PROCEDURE — 0502F SUBSEQUENT PRENATAL CARE: CPT

## 2024-08-09 ENCOUNTER — APPOINTMENT (OUTPATIENT)
Dept: ANTEPARTUM | Facility: CLINIC | Age: 29
End: 2024-08-09

## 2024-08-09 ENCOUNTER — ASOB RESULT (OUTPATIENT)
Age: 29
End: 2024-08-09

## 2024-08-09 PROBLEM — O35.BXX1 FETAL CARDIAC ANOMALY COMPLICATING PREGNANCY, ANTEPARTUM, FETUS 1: Status: ACTIVE | Noted: 2024-08-09

## 2024-08-09 PROCEDURE — 76811 OB US DETAILED SNGL FETUS: CPT

## 2024-08-09 PROCEDURE — 76817 TRANSVAGINAL US OBSTETRIC: CPT

## 2024-08-20 ENCOUNTER — ASOB RESULT (OUTPATIENT)
Age: 29
End: 2024-08-20

## 2024-08-20 ENCOUNTER — APPOINTMENT (OUTPATIENT)
Dept: ANTEPARTUM | Facility: CLINIC | Age: 29
End: 2024-08-20
Payer: COMMERCIAL

## 2024-08-20 PROCEDURE — 76816 OB US FOLLOW-UP PER FETUS: CPT

## 2024-08-26 ENCOUNTER — NON-APPOINTMENT (OUTPATIENT)
Age: 29
End: 2024-08-26

## 2024-09-05 ENCOUNTER — APPOINTMENT (OUTPATIENT)
Dept: OBGYN | Facility: CLINIC | Age: 29
End: 2024-09-05
Payer: COMMERCIAL

## 2024-09-05 VITALS
HEIGHT: 65 IN | WEIGHT: 176.06 LBS | DIASTOLIC BLOOD PRESSURE: 60 MMHG | SYSTOLIC BLOOD PRESSURE: 110 MMHG | BODY MASS INDEX: 29.33 KG/M2

## 2024-09-05 DIAGNOSIS — Z34.91 ENCOUNTER FOR SUPERVISION OF NORMAL PREGNANCY, UNSPECIFIED, FIRST TRIMESTER: ICD-10-CM

## 2024-09-05 LAB
APPEARANCE: CLEAR
BILIRUBIN URINE: NEGATIVE
BLOOD URINE: NEGATIVE
COLOR: YELLOW
GLUCOSE 1H P 50 G GLC PO SERPL-MCNC: 133 MG/DL
GLUCOSE QUALITATIVE U: 250
HCT VFR BLD CALC: 37 %
HGB BLD-MCNC: 12.3 G/DL
KETONES URINE: NEGATIVE
LEUKOCYTE ESTERASE URINE: ABNORMAL
MCHC RBC-ENTMCNC: 32.5 PG
MCHC RBC-ENTMCNC: 33.2 GM/DL
MCV RBC AUTO: 97.9 FL
NITRITE URINE: NEGATIVE
PH URINE: 6
PLATELET # BLD AUTO: 203 K/UL
PROTEIN URINE: NEGATIVE
RBC # BLD: 3.78 M/UL
RBC # FLD: 12.8 %
SPECIFIC GRAVITY URINE: >=1.03
T PALLIDUM AB SER QL IA: NEGATIVE
UROBILINOGEN URINE: 0.2 (ref 0.2–?)
WBC # FLD AUTO: 7.51 K/UL

## 2024-09-05 PROCEDURE — 0502F SUBSEQUENT PRENATAL CARE: CPT

## 2024-09-23 ENCOUNTER — NON-APPOINTMENT (OUTPATIENT)
Age: 29
End: 2024-09-23

## 2024-09-23 ENCOUNTER — APPOINTMENT (OUTPATIENT)
Dept: OBGYN | Facility: CLINIC | Age: 29
End: 2024-09-23
Payer: COMMERCIAL

## 2024-09-23 VITALS
SYSTOLIC BLOOD PRESSURE: 102 MMHG | WEIGHT: 180 LBS | HEIGHT: 65 IN | BODY MASS INDEX: 29.99 KG/M2 | DIASTOLIC BLOOD PRESSURE: 70 MMHG

## 2024-09-23 DIAGNOSIS — R30.0 DYSURIA: ICD-10-CM

## 2024-09-23 PROCEDURE — 0502F SUBSEQUENT PRENATAL CARE: CPT

## 2024-09-23 PROCEDURE — 59025 FETAL NON-STRESS TEST: CPT

## 2024-09-29 LAB
APPEARANCE: CLEAR
BACTERIA UR CULT: NORMAL
BILIRUBIN URINE: NEGATIVE
BLOOD URINE: NEGATIVE
COLOR: YELLOW
GLUCOSE QUALITATIVE U: NEGATIVE
KETONES URINE: NEGATIVE
LEUKOCYTE ESTERASE URINE: NEGATIVE
NITRITE URINE: NEGATIVE
PH URINE: 6.5
PROTEIN URINE: NEGATIVE
SPECIFIC GRAVITY URINE: 1.02
UROBILINOGEN URINE: 0.2 (ref 0.2–?)

## 2024-10-03 ENCOUNTER — APPOINTMENT (OUTPATIENT)
Dept: OBGYN | Facility: CLINIC | Age: 29
End: 2024-10-03
Payer: COMMERCIAL

## 2024-10-03 VITALS
SYSTOLIC BLOOD PRESSURE: 106 MMHG | BODY MASS INDEX: 30.16 KG/M2 | WEIGHT: 181 LBS | HEIGHT: 65 IN | DIASTOLIC BLOOD PRESSURE: 70 MMHG

## 2024-10-03 LAB
APPEARANCE: CLEAR
BILIRUBIN URINE: NEGATIVE
BLOOD URINE: NEGATIVE
COLOR: YELLOW
GLUCOSE QUALITATIVE U: NEGATIVE
KETONES URINE: NEGATIVE
LEUKOCYTE ESTERASE URINE: NEGATIVE
NITRITE URINE: NEGATIVE
PH URINE: 7
PROTEIN URINE: NEGATIVE
SPECIFIC GRAVITY URINE: 1.02
UROBILINOGEN URINE: 0.2 (ref 0.2–?)

## 2024-10-03 PROCEDURE — 0502F SUBSEQUENT PRENATAL CARE: CPT

## 2024-10-07 ENCOUNTER — RESULT REVIEW (OUTPATIENT)
Age: 29
End: 2024-10-07

## 2024-10-07 DIAGNOSIS — N63.0 UNSPECIFIED LUMP IN UNSPECIFIED BREAST: ICD-10-CM

## 2024-10-10 ENCOUNTER — OUTPATIENT (OUTPATIENT)
Dept: INPATIENT UNIT | Facility: HOSPITAL | Age: 29
LOS: 1 days | End: 2024-10-10
Payer: COMMERCIAL

## 2024-10-10 VITALS
SYSTOLIC BLOOD PRESSURE: 130 MMHG | RESPIRATION RATE: 16 BRPM | DIASTOLIC BLOOD PRESSURE: 61 MMHG | HEART RATE: 73 BPM | TEMPERATURE: 98 F

## 2024-10-10 VITALS — DIASTOLIC BLOOD PRESSURE: 61 MMHG | SYSTOLIC BLOOD PRESSURE: 130 MMHG | HEART RATE: 73 BPM

## 2024-10-10 DIAGNOSIS — O26.899 OTHER SPECIFIED PREGNANCY RELATED CONDITIONS, UNSPECIFIED TRIMESTER: ICD-10-CM

## 2024-10-10 LAB
APPEARANCE UR: CLEAR — SIGNIFICANT CHANGE UP
BACTERIA # UR AUTO: ABNORMAL /HPF
BILIRUB UR-MCNC: NEGATIVE — SIGNIFICANT CHANGE UP
COLOR SPEC: YELLOW — SIGNIFICANT CHANGE UP
DIFF PNL FLD: ABNORMAL
GLUCOSE UR QL: NEGATIVE MG/DL — SIGNIFICANT CHANGE UP
KETONES UR-MCNC: NEGATIVE MG/DL — SIGNIFICANT CHANGE UP
LEUKOCYTE ESTERASE UR-ACNC: ABNORMAL
NITRITE UR-MCNC: NEGATIVE — SIGNIFICANT CHANGE UP
PH UR: 7 — SIGNIFICANT CHANGE UP (ref 5–8)
PROT UR-MCNC: NEGATIVE MG/DL — SIGNIFICANT CHANGE UP
RBC CASTS # UR COMP ASSIST: 1 /HPF — SIGNIFICANT CHANGE UP (ref 0–4)
SP GR SPEC: 1.01 — SIGNIFICANT CHANGE UP (ref 1–1.03)
SQUAMOUS # UR AUTO: 2 /HPF — SIGNIFICANT CHANGE UP (ref 0–5)
UROBILINOGEN FLD QL: 0.2 MG/DL — SIGNIFICANT CHANGE UP (ref 0.2–1)
WBC UR QL: 2 /HPF — SIGNIFICANT CHANGE UP (ref 0–5)

## 2024-10-10 PROCEDURE — 81001 URINALYSIS AUTO W/SCOPE: CPT

## 2024-10-10 PROCEDURE — 59025 FETAL NON-STRESS TEST: CPT

## 2024-10-10 PROCEDURE — 99221 1ST HOSP IP/OBS SF/LOW 40: CPT | Mod: GC

## 2024-10-10 PROCEDURE — 87086 URINE CULTURE/COLONY COUNT: CPT

## 2024-10-10 PROCEDURE — G0463: CPT

## 2024-10-10 RX ORDER — NITROFURANTOIN MONOHYD/M-CRYST 100 MG
1 CAPSULE ORAL
Qty: 14 | Refills: 0
Start: 2024-10-10 | End: 2024-10-16

## 2024-10-10 NOTE — OB RN TRIAGE NOTE - CHIEF COMPLAINT QUOTE
I noticed spotting about a hour ago. I think I started getting UTI symptoms this morning around 9:30

## 2024-10-10 NOTE — OB RN TRIAGE NOTE - NS_DCDISPOSITION_OBGYN_ALL_OB
----- Message from Irene Hanson sent at 12/26/2019  8:16 AM CST -----  Contact: Alpesh -  - [phone]  Would like to speak with nurse in regards to pt . Please call back at [phone].       Thank You,   Irene Hanson     Home

## 2024-10-10 NOTE — OB PROVIDER TRIAGE NOTE - HISTORY OF PRESENT ILLNESS
29y  at 30 weeks GA by LMP who presents to L&D for acute onset urinary urgency associated with spotting and lower abdominal cramping. At 6:30 am, she woke up and noticed urinary urgency. No blood or malodorous urine. No burning while urinating. She's urinated more than 10 times today. An hour ago, she went to the bathroom and noticed spotting but denies vaginal bleeding. No contractions or leakage of fluid, but notes that she had white-green non-malodorous discharge for the past 2 days. Had similar episode 2 weeks ago that self-resolved. Also has midline abdominal cramping.     She endorses good fetal movement. Denies fevers, chills, nausea, vomiting, chest pain, SOB, dizziness and headache. No other complaints at this time.     LIU: _  LMP: 2024    Prenatal course uncomplicated.      POB: denies ectopics, miscarriages, or fertility problems.   PGYN: -fibroids, -ovarian cysts, -polyps, denies STD hx, denies abnormal PAPs   PMH: Denies  PSH: Denies  SH: Denies EtOH, tobacco and illicit drug use during this pregnancy; feels safe at home   Meds: PNVs  Allergies: NKDA     29y  at 29w6d GA who presents to L&D for urinary urgency associated with pressure and vaginal spotting. At 6:30 am, she woke up and noticed urinary urgency/frquency, burning on urination and blood with wiping. Denies passage of vaginal clots. No contractions or leakage of fluid. Denies hx of UTIs, back pain and fevers. She endorses good fetal movement. Denies nausea, vomiting, chest pain, SOB, dizziness and headache. No other complaints at this time.     Prenatal course uncomplicated.      POB: denies   PGYN: -fibroids, -ovarian cysts, -polyps, denies STD hx, denies abnormal PAPs   PMH: Denies  PSH: Denies  SH: Denies EtOH, tobacco and illicit drug use during this pregnancy  Meds: PNVs  Allergies: NKDA

## 2024-10-10 NOTE — OB RN TRIAGE NOTE - NSLDARRIVAL_OBGYN_ALL_OB_START_DATE
[facility]  Pulmonary, Critical Care, and Sleep Medicine  Interdisciplinary and Ventilator Weaning Rounds    Patient discussed in morning walking rounds and interdisciplinary rounds. ICU day: 3/25    Overnight events:    No acute events, remains non-verbal    Assessments and best practice:   Ventilator  - na   Sedation  o none   Other pertinent drips  o n/a   Lines noted  o IJ CVL   Critical labs assessed  o Yes   Antibiotics  o Vancomycin and Merrem   Medications reviewed  o Yes   Pending imaging  o echo   Pending send out labs  o No   Pending Procedures  o None   Glycemic control   Stress ulcer prophylaxis. o Pepcid   DVT prophylaxis.   o none   Need for Lines, dove assessed.  o Yes   Restraint Reevaluation     na    Family contact/MPOA: niece  Family updated: will contact and update after rounds     Palliative consult within 3 days of admission to ICU-  Ethics Guidance: 21 days      Daily Plans:   Vascular consult for PAD   Insert NGT and start feeds, monitor for re feeding syndrome   Re-start HTN meds    SUZANNE time 10 minutes        Kayla Fairbanks NP  03/28/22  Pulmonary, [address] [facility] [facility] 10-Oct-2024 19:54

## 2024-10-10 NOTE — OB PROVIDER TRIAGE NOTE - NSHPPHYSICALEXAM_GEN_ALL_CORE
T(C): --  HR: 73 (10-10-24 @ 20:06) (73 - 73)  BP: 130/61 (10-10-24 @ 20:06) (130/61 - 130/61)  RR: --  SpO2: --    Gen: NAD, well-appearing, AAOx3   Abd: Soft, gravid  Ext: non-tender, non-edematous  SSE:   SVE:    Bedside sono:  FHT:  [place]: Vital Signs Last 24 Hrs  T(C): 36.5 (10 Oct 2024 21:43), Max: 36.5 (10 Oct 2024 21:41)  T(F): 97.7 (10 Oct 2024 21:43), Max: 97.7 (10 Oct 2024 21:41)  HR: 73 (10 Oct 2024 21:43) (73 - 77)  BP: 130/61 (10 Oct 2024 21:43) (122/57 - 130/61)  RR: 16 (10 Oct 2024 21:43) (16 - 16)    Parameters below as of 10 Oct 2024 21:43  Patient On (Oxygen Delivery Method): room air    Gen: NAD, well-appearing, AAOx3   Abd: Soft, gravid  Ext: non-tender, non-edematous  SSE: no gross pooling, no vaginal bleeding noted    FHT: baseline 125, moderate variability, +accels, -decels   [place]: no CTX

## 2024-10-10 NOTE — OB PROVIDER TRIAGE NOTE - NSOBPROVIDERNOTE_OBGYN_ALL_OB_FT
29y  at 30 weeks GA by LMP who presents to L&D for acute onset urinary urgency associated with spotting and lower abdominal cramping. At 6:30 am, she woke up and noticed urinary urgency. No blood or malodorous urine. No burning while urinating. She's urinated more than 10 times today. An hour ago, she went to the bathroom and noticed spotting but denies vaginal bleeding. No contractions or leakage of fluid, but notes that she had white-green non-malodorous discharge for the past 2 days. Had similar episode 2 weeks ago that self-resolved. Also has midline abdominal cramping.     A/P:     #dysuria  1) UA    #white-green vaginal discharge  1) White-green discharge, likely parasite infection  2) Metronidazole  3) GC/CT swab  4) f/u vaginitis panel    -Admit to L&D      Discussed with  _ 29y  at 29w6d GA who presents to L&D for urinary urgency associated with pressure and vaginal spotting.    - VSS  - FHT reassuring  - UA with trace leukocytes with symptoms concerns for UTI  - Macrobid 100mg BID for 7 days sent to pharmacy  - Pending UCx    Discussed with Dr. Forbes

## 2024-10-10 NOTE — OB PROVIDER TRIAGE NOTE - ATTENDING COMMENTS
29y  at 29w6d GA who presents to L&D for urinary urgency associated with pressure and vaginal spotting. At 6:30 am, she woke up and noticed urinary urgency/frquency, burning on urination and blood with wiping. Denies passage of vaginal clots. No contractions or leakage of fluid. Denies hx of UTIs, back pain and fevers. She endorses good fetal movement.  HR: 73 (10 Oct 2024 21:43) (73 - 77)  BP: 130/61 (10 Oct 2024 21:43) (122/57 - 130/61)  FHT - reassuring for GA  toco - no contractions  SSE: no VB cervix closed  UA - LE  28 y/o  @ 29+6 with urinary urgency and pressure concerning for UTI   - UA with LE, will send culture    - given symptoms will plan to treat empirically    - Macrobid Rx sent   - f/u with OB as scheduled    Gaby Forbes MD

## 2024-10-11 ENCOUNTER — APPOINTMENT (OUTPATIENT)
Dept: ANTEPARTUM | Facility: CLINIC | Age: 29
End: 2024-10-11
Payer: COMMERCIAL

## 2024-10-11 ENCOUNTER — ASOB RESULT (OUTPATIENT)
Age: 29
End: 2024-10-11

## 2024-10-11 ENCOUNTER — APPOINTMENT (OUTPATIENT)
Dept: PEDIATRIC CARDIOLOGY | Facility: CLINIC | Age: 29
End: 2024-10-11

## 2024-10-11 DIAGNOSIS — O35.9XX0 MATERNAL CARE FOR (SUSPECTED) FETAL ABNORMALITY AND DAMAGE, UNSPECIFIED, NOT APPLICABLE OR UNSPECIFIED: ICD-10-CM

## 2024-10-11 LAB
CULTURE RESULTS: SIGNIFICANT CHANGE UP
SPECIMEN SOURCE: SIGNIFICANT CHANGE UP

## 2024-10-11 PROCEDURE — 99203 OFFICE O/P NEW LOW 30 MIN: CPT | Mod: 25

## 2024-10-11 PROCEDURE — 76827 ECHO EXAM OF FETAL HEART: CPT

## 2024-10-11 PROCEDURE — 76821 MIDDLE CEREBRAL ARTERY ECHO: CPT

## 2024-10-11 PROCEDURE — 76816 OB US FOLLOW-UP PER FETUS: CPT

## 2024-10-11 PROCEDURE — 93325 DOPPLER ECHO COLOR FLOW MAPG: CPT | Mod: 59

## 2024-10-11 PROCEDURE — 76820 UMBILICAL ARTERY ECHO: CPT

## 2024-10-11 PROCEDURE — 76825 ECHO EXAM OF FETAL HEART: CPT

## 2024-10-14 ENCOUNTER — OUTPATIENT (OUTPATIENT)
Dept: OUTPATIENT SERVICES | Facility: HOSPITAL | Age: 29
LOS: 1 days | End: 2024-10-14
Payer: COMMERCIAL

## 2024-10-14 ENCOUNTER — RESULT REVIEW (OUTPATIENT)
Age: 29
End: 2024-10-14

## 2024-10-14 ENCOUNTER — APPOINTMENT (OUTPATIENT)
Dept: ULTRASOUND IMAGING | Facility: CLINIC | Age: 29
End: 2024-10-14
Payer: COMMERCIAL

## 2024-10-14 DIAGNOSIS — O26.853 SPOTTING COMPLICATING PREGNANCY, THIRD TRIMESTER: ICD-10-CM

## 2024-10-14 DIAGNOSIS — R35.0 FREQUENCY OF MICTURITION: ICD-10-CM

## 2024-10-14 DIAGNOSIS — N63.0 UNSPECIFIED LUMP IN UNSPECIFIED BREAST: ICD-10-CM

## 2024-10-14 DIAGNOSIS — Z3A.29 29 WEEKS GESTATION OF PREGNANCY: ICD-10-CM

## 2024-10-14 DIAGNOSIS — O26.893 OTHER SPECIFIED PREGNANCY RELATED CONDITIONS, THIRD TRIMESTER: ICD-10-CM

## 2024-10-14 DIAGNOSIS — R39.15 URGENCY OF URINATION: ICD-10-CM

## 2024-10-14 PROCEDURE — 76642 ULTRASOUND BREAST LIMITED: CPT | Mod: 26,LT

## 2024-10-14 PROCEDURE — 76642 ULTRASOUND BREAST LIMITED: CPT

## 2024-10-24 ENCOUNTER — APPOINTMENT (OUTPATIENT)
Dept: OBGYN | Facility: CLINIC | Age: 29
End: 2024-10-24
Payer: COMMERCIAL

## 2024-10-24 VITALS
WEIGHT: 186.25 LBS | SYSTOLIC BLOOD PRESSURE: 100 MMHG | DIASTOLIC BLOOD PRESSURE: 60 MMHG | HEIGHT: 65 IN | BODY MASS INDEX: 31.03 KG/M2

## 2024-10-24 PROCEDURE — 0502F SUBSEQUENT PRENATAL CARE: CPT

## 2024-11-14 ENCOUNTER — APPOINTMENT (OUTPATIENT)
Dept: OBGYN | Facility: CLINIC | Age: 29
End: 2024-11-14

## 2024-11-14 VITALS
WEIGHT: 194.31 LBS | HEIGHT: 65 IN | BODY MASS INDEX: 32.37 KG/M2 | SYSTOLIC BLOOD PRESSURE: 112 MMHG | DIASTOLIC BLOOD PRESSURE: 60 MMHG

## 2024-11-14 DIAGNOSIS — Z23 ENCOUNTER FOR IMMUNIZATION: ICD-10-CM

## 2024-11-14 LAB
APPEARANCE: CLEAR
BILIRUBIN URINE: NEGATIVE
BLOOD URINE: NEGATIVE
COLOR: YELLOW
GLUCOSE QUALITATIVE U: NEGATIVE
KETONES URINE: NEGATIVE
LEUKOCYTE ESTERASE URINE: NEGATIVE
NITRITE URINE: NEGATIVE
PH URINE: 7
PROTEIN URINE: NEGATIVE
SPECIFIC GRAVITY URINE: 1.01
UROBILINOGEN URINE: 0.2 (ref 0.2–?)

## 2024-11-14 PROCEDURE — 90471 IMMUNIZATION ADMIN: CPT

## 2024-11-14 PROCEDURE — 90715 TDAP VACCINE 7 YRS/> IM: CPT

## 2024-11-14 PROCEDURE — 0502F SUBSEQUENT PRENATAL CARE: CPT

## 2024-11-14 RX ORDER — METRONIDAZOLE 7.5 MG/G
0.75 GEL VAGINAL
Qty: 1 | Refills: 0 | Status: ACTIVE | COMMUNITY
Start: 2024-11-14 | End: 1900-01-01

## 2024-11-18 DIAGNOSIS — Z34.92 ENCOUNTER FOR SUPERVISION OF NORMAL PREGNANCY, UNSPECIFIED, SECOND TRIMESTER: ICD-10-CM

## 2024-11-19 ENCOUNTER — APPOINTMENT (OUTPATIENT)
Dept: ANTEPARTUM | Facility: CLINIC | Age: 29
End: 2024-11-19
Payer: COMMERCIAL

## 2024-11-19 ENCOUNTER — ASOB RESULT (OUTPATIENT)
Age: 29
End: 2024-11-19

## 2024-11-19 PROCEDURE — 76816 OB US FOLLOW-UP PER FETUS: CPT

## 2024-11-22 ENCOUNTER — APPOINTMENT (OUTPATIENT)
Dept: OBGYN | Facility: CLINIC | Age: 29
End: 2024-11-22
Payer: COMMERCIAL

## 2024-11-22 VITALS
BODY MASS INDEX: 32.32 KG/M2 | WEIGHT: 194 LBS | SYSTOLIC BLOOD PRESSURE: 110 MMHG | HEIGHT: 65 IN | DIASTOLIC BLOOD PRESSURE: 77 MMHG

## 2024-11-22 PROCEDURE — 0502F SUBSEQUENT PRENATAL CARE: CPT

## 2024-11-25 LAB
APPEARANCE: CLEAR
BILIRUBIN URINE: NEGATIVE
BLOOD URINE: NEGATIVE
COLOR: YELLOW
GLUCOSE QUALITATIVE U: NEGATIVE
GP B STREP DNA SPEC QL NAA+PROBE: NOT DETECTED
HCT VFR BLD CALC: 38.3 %
HCV AB SER QL: NONREACTIVE
HCV S/CO RATIO: 0.06 S/CO
HGB BLD-MCNC: 12.8 G/DL
HIV1+2 AB SPEC QL IA.RAPID: NONREACTIVE
KETONES URINE: NEGATIVE
LEUKOCYTE ESTERASE URINE: NEGATIVE
MCHC RBC-ENTMCNC: 32.3 PG
MCHC RBC-ENTMCNC: 33.4 G/DL
MCV RBC AUTO: 96.7 FL
NITRITE URINE: NEGATIVE
PH URINE: 7
PLATELET # BLD AUTO: 244 K/UL
PROTEIN URINE: NEGATIVE
RBC # BLD: 3.96 M/UL
RBC # FLD: 12.4 %
SOURCE GBS: NORMAL
SPECIFIC GRAVITY URINE: 1.01
T PALLIDUM AB SER QL IA: NEGATIVE
UROBILINOGEN URINE: 0.2 (ref 0.2–?)
WBC # FLD AUTO: 11.67 K/UL

## 2024-11-27 ENCOUNTER — APPOINTMENT (OUTPATIENT)
Dept: OBGYN | Facility: CLINIC | Age: 29
End: 2024-11-27
Payer: COMMERCIAL

## 2024-11-27 VITALS
SYSTOLIC BLOOD PRESSURE: 122 MMHG | WEIGHT: 197.06 LBS | HEIGHT: 65 IN | BODY MASS INDEX: 32.83 KG/M2 | DIASTOLIC BLOOD PRESSURE: 72 MMHG

## 2024-11-27 LAB
APPEARANCE: CLEAR
BILIRUBIN URINE: NEGATIVE
BLOOD URINE: NEGATIVE
COLOR: YELLOW
GLUCOSE QUALITATIVE U: NEGATIVE
KETONES URINE: 40
LEUKOCYTE ESTERASE URINE: ABNORMAL
NITRITE URINE: NEGATIVE
PH URINE: 6
PROTEIN URINE: NEGATIVE
SPECIFIC GRAVITY URINE: 1.02
UROBILINOGEN URINE: 0.2 (ref 0.2–?)

## 2024-11-27 PROCEDURE — 81003 URINALYSIS AUTO W/O SCOPE: CPT | Mod: NC,QW

## 2024-11-27 PROCEDURE — 0502F SUBSEQUENT PRENATAL CARE: CPT

## 2024-12-06 ENCOUNTER — NON-APPOINTMENT (OUTPATIENT)
Age: 29
End: 2024-12-06

## 2024-12-06 ENCOUNTER — APPOINTMENT (OUTPATIENT)
Dept: OBGYN | Facility: CLINIC | Age: 29
End: 2024-12-06
Payer: COMMERCIAL

## 2024-12-06 VITALS
SYSTOLIC BLOOD PRESSURE: 112 MMHG | BODY MASS INDEX: 33.32 KG/M2 | WEIGHT: 200 LBS | DIASTOLIC BLOOD PRESSURE: 70 MMHG | HEIGHT: 65 IN

## 2024-12-06 DIAGNOSIS — Z34.93 ENCOUNTER FOR SUPERVISION OF NORMAL PREGNANCY, UNSPECIFIED, THIRD TRIMESTER: ICD-10-CM

## 2024-12-06 PROCEDURE — 0502F SUBSEQUENT PRENATAL CARE: CPT

## 2024-12-09 ENCOUNTER — TRANSCRIPTION ENCOUNTER (OUTPATIENT)
Age: 29
End: 2024-12-09

## 2024-12-09 LAB
ALBUMIN SERPL ELPH-MCNC: 3.4 G/DL
ALP BLD-CCNC: 159 U/L
ALT SERPL-CCNC: 12 U/L
ANION GAP SERPL CALC-SCNC: 15 MMOL/L
AST SERPL-CCNC: 16 U/L
BASOPHILS # BLD AUTO: 0.01 K/UL
BASOPHILS NFR BLD AUTO: 0.1 %
BILIRUB SERPL-MCNC: <0.2 MG/DL
BUN SERPL-MCNC: 8 MG/DL
CALCIUM SERPL-MCNC: 9.1 MG/DL
CHLORIDE SERPL-SCNC: 101 MMOL/L
CO2 SERPL-SCNC: 20 MMOL/L
CREAT SERPL-MCNC: 0.54 MG/DL
CREAT SPEC-SCNC: 31 MG/DL
CREAT/PROT UR: 0.1 RATIO
EGFR: 129 ML/MIN/1.73M2
EOSINOPHIL # BLD AUTO: 0.09 K/UL
EOSINOPHIL NFR BLD AUTO: 0.9 %
GLUCOSE SERPL-MCNC: 67 MG/DL
HCT VFR BLD CALC: 36.2 %
HGB BLD-MCNC: 12 G/DL
IMM GRANULOCYTES NFR BLD AUTO: 0.4 %
LYMPHOCYTES # BLD AUTO: 1.5 K/UL
LYMPHOCYTES NFR BLD AUTO: 14.4 %
MAN DIFF?: NORMAL
MCHC RBC-ENTMCNC: 31 PG
MCHC RBC-ENTMCNC: 33.1 G/DL
MCV RBC AUTO: 93.5 FL
MONOCYTES # BLD AUTO: 0.74 K/UL
MONOCYTES NFR BLD AUTO: 7.1 %
NEUTROPHILS # BLD AUTO: 8.02 K/UL
NEUTROPHILS NFR BLD AUTO: 77.1 %
PLATELET # BLD AUTO: 234 K/UL
POTASSIUM SERPL-SCNC: 4.7 MMOL/L
PROT SERPL-MCNC: 5.9 G/DL
PROT UR-MCNC: 4 MG/DL
RBC # BLD: 3.87 M/UL
RBC # FLD: 12.4 %
SODIUM SERPL-SCNC: 136 MMOL/L
WBC # FLD AUTO: 10.4 K/UL

## 2024-12-11 ENCOUNTER — NON-APPOINTMENT (OUTPATIENT)
Age: 29
End: 2024-12-11

## 2024-12-11 ENCOUNTER — OUTPATIENT (OUTPATIENT)
Dept: OUTPATIENT SERVICES | Facility: HOSPITAL | Age: 29
LOS: 1 days | End: 2024-12-11
Payer: COMMERCIAL

## 2024-12-11 VITALS — DIASTOLIC BLOOD PRESSURE: 86 MMHG | HEART RATE: 77 BPM | SYSTOLIC BLOOD PRESSURE: 127 MMHG

## 2024-12-11 VITALS
HEART RATE: 77 BPM | SYSTOLIC BLOOD PRESSURE: 127 MMHG | RESPIRATION RATE: 16 BRPM | TEMPERATURE: 98 F | DIASTOLIC BLOOD PRESSURE: 86 MMHG

## 2024-12-11 DIAGNOSIS — O26.899 OTHER SPECIFIED PREGNANCY RELATED CONDITIONS, UNSPECIFIED TRIMESTER: ICD-10-CM

## 2024-12-11 PROCEDURE — G0463: CPT

## 2024-12-11 PROCEDURE — 59025 FETAL NON-STRESS TEST: CPT

## 2024-12-11 RX ORDER — ACETAMINOPHEN 500MG 500 MG/1
975 TABLET, COATED ORAL ONCE
Refills: 0 | Status: COMPLETED | OUTPATIENT
Start: 2024-12-11 | End: 2024-12-11

## 2024-12-11 RX ADMIN — ACETAMINOPHEN 500MG 975 MILLIGRAM(S): 500 TABLET, COATED ORAL at 18:17

## 2024-12-11 RX ADMIN — ACETAMINOPHEN 500MG 975 MILLIGRAM(S): 500 TABLET, COATED ORAL at 17:47

## 2024-12-11 NOTE — OB PROVIDER TRIAGE NOTE - HISTORY OF PRESENT ILLNESS
RENNY BEAVER is a 29y  at 38w5d GA who presents to L&D with decreased fetal movement x 1 day. She denies LOF, VB, contractions. She also complains of headache, denies light/nosie sensitivity. Denies hx migraines. denies visual disturbances, RUQ pain, epigastric pain and new-onset edema.   Denies additional complaints.     Pregnancy complications:     POB:     PGYN:      Denies fibroids, ovarian cysts      Denies hx STDs     Denies abnormal pap smears     PMH: denies     PSH: denies     SH:      Denies tobacco use     Denies EtOH use      Denies illicit drug use      Feels safe at home     Meds: PNVs     All: NKDA

## 2024-12-11 NOTE — OB RN TRIAGE NOTE - FALL HARM RISK - UNIVERSAL INTERVENTIONS
Bed in lowest position, wheels locked, appropriate side rails in place/Call bell, personal items and telephone in reach/Instruct patient to call for assistance before getting out of bed or chair/Non-slip footwear when patient is out of bed/[place] to call system/Physically safe environment - no spills, clutter or unnecessary equipment/Purposeful Proactive Rounding/Room/bathroom lighting operational, light cord in reach

## 2024-12-11 NOTE — OB PROVIDER TRIAGE NOTE - NSOBPROVIDERNOTE_OBGYN_ALL_OB_FT
A/P: 29y  at 38w5d GA who presents to L&D with decreased fetal movement x 1 day and HA.    Maternal VSS  Trial tylenol   FHT reactive  BPP reassurring, patient notes return of fetal movement   [place]: not clive   Dispo: Continue to observe, reassess for improvement of HA     Discussed with Dr. Conley    Signed: Rossy Carpio MD (PGY1) A/P: 29y  at 38w5d GA who presents to L&D with decreased fetal movement x 1 day and HA.    Maternal VSS  Trial tylenol   FHT reactive  BPP reassurring, patient notes return of fetal movement   [place]: not clive   Dispo: Continue to observe, reassess for improvement of HA     Discussed with Dr. Conley    Signed: Rossy Carpio MD (PGY1)    ---------  UPDATE    headache resolved with tylenol  patient scheduled for outpatient followup on  with Dr. Conley   return precautions discussed, patient expressed understanding and discharged in stable condition

## 2024-12-11 NOTE — OB RN TRIAGE NOTE - NSLDARRIVAL_OBGYN_ALL_OB_END_DATE
At the last visit we discussed the possibility of an ischemia evaluation which has been scheduled for September.  Patient encouraged to partake in regular aerobic exercise.  
Post pacemaker and Watchman procedure.  Doing adequate without any recent palpitations.  
11-Dec-2024 18:20

## 2024-12-11 NOTE — OB PROVIDER TRIAGE NOTE - NSHPPHYSICALEXAM_GEN_ALL_CORE
T(C): --  HR: 77 (12-11-24 @ 16:50) (77 - 77)  BP: 127/86 (12-11-24 @ 16:50) (127/86 - 127/86)  RR: --  SpO2: --    Gen: NAD, well-appearing  Abd: soft, gravid  Ext: non-edematous, non-tender   FHT: 130, mod ra, + accels, - decels   [place]: not clive   Sono: bpp 8/8, vertex, anterior placenta, osmin 15.3

## 2024-12-12 DIAGNOSIS — Z3A.38 38 WEEKS GESTATION OF PREGNANCY: ICD-10-CM

## 2024-12-12 DIAGNOSIS — O36.8130 DECREASED FETAL MOVEMENTS, THIRD TRIMESTER, NOT APPLICABLE OR UNSPECIFIED: ICD-10-CM

## 2024-12-12 DIAGNOSIS — O26.893 OTHER SPECIFIED PREGNANCY RELATED CONDITIONS, THIRD TRIMESTER: ICD-10-CM

## 2024-12-12 DIAGNOSIS — R51.9 HEADACHE, UNSPECIFIED: ICD-10-CM

## 2024-12-13 ENCOUNTER — INPATIENT (INPATIENT)
Facility: HOSPITAL | Age: 29
LOS: 3 days | Discharge: HOME | DRG: 833 | End: 2024-12-17
Attending: STUDENT IN AN ORGANIZED HEALTH CARE EDUCATION/TRAINING PROGRAM | Admitting: STUDENT IN AN ORGANIZED HEALTH CARE EDUCATION/TRAINING PROGRAM
Payer: COMMERCIAL

## 2024-12-13 ENCOUNTER — APPOINTMENT (OUTPATIENT)
Dept: OBGYN | Facility: CLINIC | Age: 29
End: 2024-12-13
Payer: COMMERCIAL

## 2024-12-13 VITALS
WEIGHT: 204 LBS | DIASTOLIC BLOOD PRESSURE: 78 MMHG | SYSTOLIC BLOOD PRESSURE: 112 MMHG | BODY MASS INDEX: 33.99 KG/M2 | HEIGHT: 65 IN

## 2024-12-13 DIAGNOSIS — O26.899 OTHER SPECIFIED PREGNANCY RELATED CONDITIONS, UNSPECIFIED TRIMESTER: ICD-10-CM

## 2024-12-13 PROCEDURE — 0502F SUBSEQUENT PRENATAL CARE: CPT

## 2024-12-14 VITALS
HEART RATE: 85 BPM | DIASTOLIC BLOOD PRESSURE: 76 MMHG | SYSTOLIC BLOOD PRESSURE: 132 MMHG | RESPIRATION RATE: 16 BRPM | TEMPERATURE: 98 F

## 2024-12-14 DIAGNOSIS — O26.893 OTHER SPECIFIED PREGNANCY RELATED CONDITIONS, THIRD TRIMESTER: ICD-10-CM

## 2024-12-14 PROBLEM — K25.9 GASTRIC ULCER, UNSPECIFIED AS ACUTE OR CHRONIC, WITHOUT HEMORRHAGE OR PERFORATION: Chronic | Status: ACTIVE | Noted: 2024-12-11

## 2024-12-14 LAB
BASOPHILS # BLD AUTO: 0.02 K/UL — SIGNIFICANT CHANGE UP (ref 0–0.2)
BASOPHILS NFR BLD AUTO: 0.1 % — SIGNIFICANT CHANGE UP (ref 0–2)
BLD GP AB SCN SERPL QL: SIGNIFICANT CHANGE UP
EOSINOPHIL # BLD AUTO: 0.12 K/UL — SIGNIFICANT CHANGE UP (ref 0–0.5)
EOSINOPHIL NFR BLD AUTO: 0.9 % — SIGNIFICANT CHANGE UP (ref 0–6)
HCT VFR BLD CALC: 34.6 % — SIGNIFICANT CHANGE UP (ref 34.5–45)
HGB BLD-MCNC: 11.8 G/DL — SIGNIFICANT CHANGE UP (ref 11.5–15.5)
IMM GRANULOCYTES NFR BLD AUTO: 0.4 % — SIGNIFICANT CHANGE UP (ref 0–0.9)
LYMPHOCYTES # BLD AUTO: 1.38 K/UL — SIGNIFICANT CHANGE UP (ref 1–3.3)
LYMPHOCYTES # BLD AUTO: 9.9 % — LOW (ref 13–44)
MCHC RBC-ENTMCNC: 30.9 PG — SIGNIFICANT CHANGE UP (ref 27–34)
MCHC RBC-ENTMCNC: 34.1 G/DL — SIGNIFICANT CHANGE UP (ref 32–36)
MCV RBC AUTO: 90.6 FL — SIGNIFICANT CHANGE UP (ref 80–100)
MONOCYTES # BLD AUTO: 1.06 K/UL — HIGH (ref 0–0.9)
MONOCYTES NFR BLD AUTO: 7.6 % — SIGNIFICANT CHANGE UP (ref 2–14)
NEUTROPHILS # BLD AUTO: 11.31 K/UL — HIGH (ref 1.8–7.4)
NEUTROPHILS NFR BLD AUTO: 81.1 % — HIGH (ref 43–77)
PLATELET # BLD AUTO: 199 K/UL — SIGNIFICANT CHANGE UP (ref 150–400)
RBC # BLD: 3.82 M/UL — SIGNIFICANT CHANGE UP (ref 3.8–5.2)
RBC # FLD: 12.1 % — SIGNIFICANT CHANGE UP (ref 10.3–14.5)
T PALLIDUM AB TITR SER: NEGATIVE — SIGNIFICANT CHANGE UP
WBC # BLD: 13.95 K/UL — HIGH (ref 3.8–10.5)
WBC # FLD AUTO: 13.95 K/UL — HIGH (ref 3.8–10.5)

## 2024-12-14 RX ORDER — INFLUENZA VIRUS VACCINE 15; 15; 15; 15 UG/.5ML; UG/.5ML; UG/.5ML; UG/.5ML
0.5 SUSPENSION INTRAMUSCULAR ONCE
Refills: 0 | Status: COMPLETED | OUTPATIENT
Start: 2024-12-14 | End: 2024-12-14

## 2024-12-14 RX ORDER — AMPICILLIN TRIHYDRATE 250 MG/5ML
2 SUSPENSION, RECONSTITUTED, ORAL (ML) ORAL ONCE
Refills: 0 | Status: COMPLETED | OUTPATIENT
Start: 2024-12-14 | End: 2024-12-14

## 2024-12-14 RX ORDER — AMPICILLIN TRIHYDRATE 250 MG/5ML
2 SUSPENSION, RECONSTITUTED, ORAL (ML) ORAL EVERY 6 HOURS
Refills: 0 | Status: DISCONTINUED | OUTPATIENT
Start: 2024-12-15 | End: 2024-12-15

## 2024-12-14 RX ORDER — AMPICILLIN TRIHYDRATE 250 MG/5ML
SUSPENSION, RECONSTITUTED, ORAL (ML) ORAL
Refills: 0 | Status: DISCONTINUED | OUTPATIENT
Start: 2024-12-14 | End: 2024-12-15

## 2024-12-14 RX ORDER — CHLORHEXIDINE GLUCONATE 1.2 MG/ML
1 RINSE ORAL DAILY
Refills: 0 | Status: DISCONTINUED | OUTPATIENT
Start: 2024-12-14 | End: 2024-12-15

## 2024-12-14 RX ORDER — ACETAMINOPHEN 500MG 500 MG/1
1000 TABLET, COATED ORAL ONCE
Refills: 0 | Status: COMPLETED | OUTPATIENT
Start: 2024-12-14 | End: 2024-12-14

## 2024-12-14 RX ORDER — GENTAMICIN SULFATE 40 MG/ML
360 VIAL (ML) INJECTION EVERY 24 HOURS
Refills: 0 | Status: DISCONTINUED | OUTPATIENT
Start: 2024-12-14 | End: 2024-12-15

## 2024-12-14 RX ORDER — 0.9 % SODIUM CHLORIDE 0.9 %
1000 INTRAVENOUS SOLUTION INTRAVENOUS
Refills: 0 | Status: DISCONTINUED | OUTPATIENT
Start: 2024-12-14 | End: 2024-12-15

## 2024-12-14 RX ORDER — TRISODIUM CITRATE DIHYDRATE AND CITRIC ACID MONOHYDRATE 500; 334 MG/5ML; MG/5ML
30 SOLUTION ORAL ONCE
Refills: 0 | Status: DISCONTINUED | OUTPATIENT
Start: 2024-12-14 | End: 2024-12-15

## 2024-12-14 RX ADMIN — Medication 200 MILLIGRAM(S): at 23:12

## 2024-12-14 RX ADMIN — Medication 2 MILLIUNIT(S)/MIN: at 17:29

## 2024-12-14 RX ADMIN — Medication 125 MILLILITER(S): at 20:00

## 2024-12-14 RX ADMIN — Medication 125 MILLILITER(S): at 04:43

## 2024-12-14 RX ADMIN — Medication 125 MILLILITER(S): at 11:57

## 2024-12-14 RX ADMIN — Medication 200 GRAM(S): at 22:20

## 2024-12-14 RX ADMIN — ACETAMINOPHEN 500MG 400 MILLIGRAM(S): 500 TABLET, COATED ORAL at 22:19

## 2024-12-14 NOTE — OB PROVIDER LABOR PROGRESS NOTE - NS_SUBJECTIVE/OBJECTIVE_OBGYN_ALL_OB_FT
Resting comfortably after epidural placement
Vital Signs Last 24 Hrs  T(C): 36.7 (14 Dec 2024 04:39), Max: 36.8 (14 Dec 2024 00:19)  T(F): 98.06 (14 Dec 2024 04:39), Max: 98.24 (14 Dec 2024 00:27)  HR: 82 (14 Dec 2024 04:19) (82 - 90)  BP: 130/77 (14 Dec 2024 04:19) (125/75 - 132/76)  RR: 16 (14 Dec 2024 02:04) (16 - 16)    Parameters below as of 14 Dec 2024 00:19  Patient On (Oxygen Delivery Method): room air
re-assessment of patient s/p bloody show. Patient not feeling the need to push yet.  Spiked a temp of 38.3C, started on Amp/Gen/ofirmev in view of rom > 24hrs.
re-assessment of patient

## 2024-12-14 NOTE — OB PROVIDER H&P - ATTENDING COMMENTS
28yo  at 39w1d by embryo transfer date (4/3/24) presents to L&D triage with complaints of leakage of fluid since ~945pm, admin for PROM      Plan:  - Irregular ctx, requires additional cervical ripening, will givebuccal cytotec  - Epidural prn      Subjective Hx and Physical Exam reviewed.  I agree with the Resident Physician's assessment and plan of care, as discussed above.  R/B/A of admission for labor management, vaginal delivery with possible  section discussed at length, including medications and options for pain management.  She has no Syn[employer] or personal objection to blood transfusion, if necessary.  She was given the opportunity to ask questions and all were addressed.  She understands the plan of care.    Katherinne Rimpel, MD

## 2024-12-14 NOTE — OB RN DELIVERY SUMMARY - NS_SEPSISRSKCALC_OBGYN_ALL_OB_FT
EOS calculated successfully. EOS Risk Factor: 0.5/1000 live births ([facility] national incidence); GA=39w2d; Temp=100.94; ROM=27.65; GBS='Negative'; Antibiotics='No antibiotics or any antibiotics < 2 hrs prior to birth'

## 2024-12-14 NOTE — OB RN DELIVERY SUMMARY - NSSELHIDDEN_OBGYN_ALL_OB_FT
[NS_DeliveryAttending1_OBGYN_ALL_OB_FT:[id number]],[NS_DeliveryAssist1_OBGYN_ALL_OB_FT:[email or web address]],[NS_DeliveryRN_OBGYN_ALL_OB_FT:[id number]=]

## 2024-12-14 NOTE — OB RN TRIAGE NOTE - FALL HARM RISK - UNIVERSAL INTERVENTIONS
Bed in lowest position, wheels locked, appropriate side rails in place/Call bell, personal items and telephone in reach/Instruct patient to call for assistance before getting out of bed or chair/Non-slip footwear when patient is out of bed/[place] to call system/Physically safe environment - no spills, clutter or unnecessary equipment/Purposeful Proactive Rounding/Room/bathroom lighting operational, light cord in reach

## 2024-12-14 NOTE — OB RN TRIAGE NOTE - NSICDXPASTMEDICALHX_GEN_ALL_CORE_FT
Jason Mayfield is a 87-year-old male with a past medical history significant for hypertension, NSTEMI, chronic diastolic heart failure, COPD / emphysema, chronic hypoxic respiratory failure on home oxygen of 2L/NC, BPH, hypothyroidism, AAA, chronic back pain, neuropathy, daily alcohol use, and former tobacco abuse who presented for evaluation of left lower chest pain and shortness of breath. Patient and family endorses a dry non-productive cough which had been ongoing for approximately 5days. Denies fever / chills or diarrhea at home. Endorses constipation and reports no bowel movement for almost two weeks. On exam, some mild LUQ tenderness with palpation; however, no guarding noted. Abdomen firm to touch with faint minimal bowel sounds. Due to his severe pain, the patient reports difficulty with taking deep breaths. Pain exacerbated by cough / deep inspiration. CTA chest completed at time of presentation demonstrates extensive emphysematous changes, bilateral pleural effusions, and RLL consolidation / mass. Patient was admitted by hospital medicine and pulmonary consulted for evaluation.     Of note, patient is followed by Dr. Gant with [facility] pulmonology. 60-pack year smoking history. Previous CT imaging in September 2023 with marked emphysematous changes and some bronchial wall thickening. No evidence of mass / consolidation / nodules on imaging. Trace bilateral effusions noted. Patient is a resident in [place], [place] with reports of 3 cats and 2 dogs in their home.     Patient recently seen by Dr. Gant in March 2023 who felt his shortness of breath was multifactorial. Patient is noted to have moderate impairment on his pulmonary testing and felt his conditions were exacerbated by his underlying diastolic heart failure. In addition, it was felt a significant portion of his SOB was likely contributed to physical deconditioning as he demonstrates activity intolerance with an inability to walk greater  than 100ft without worsening dyspnea.    Interval history, f/u chief complaint shortness of breath:  4/24: patient attempted repositioning himself in bed with notable increased work of breathing with associated wheezing noted on exam. Patient transitioned to Vapotherm 25/0.50 this morning and given IV Solu-Medrol with nebulizer treatment. Patient pulmonary status improved. Significant abdominal distention which is likely contributing to his underlying shortness of breath. Indicates some stool production yesterday; however, sounds minimal in nature in comparison to his abdominal imaging.     Objective:     Vital Signs (Most Recent):  Temp: 98.2 °F (36.8 °C) (04/24/24 0740)  Pulse: 96 (04/24/24 0849)  Resp: (!) 26 (04/24/24 0808)  BP: 138/82 (04/24/24 0740)  SpO2: 95 % (04/24/24 0808) Vital Signs (24h Range):  Temp:  [97.6 °F (36.4 °C)-98.2 °F (36.8 °C)] 98.2 °F (36.8 °C)  Pulse:  [] 96  Resp:  [18-26] 26  SpO2:  [84 %-95 %] 95 %  BP: (122-140)/(58-82) 138/82   Weight: 80.6 kg (177 lb 11.1 oz);  Body mass index is 24.78 kg/m².    Intake/Output Summary (Last 24 hours) at 4/24/2024 0943  Last data filed at 4/23/2024 1735  Gross per 24 hour   Intake --   Output 850 ml   Net -850 ml      Physical Exam  Vitals and nursing note reviewed.   HENT:      Head: Normocephalic.   Eyes:      Conjunctiva/sclera: Conjunctivae normal.   Cardiovascular:      Rate and Rhythm: Tachycardia present.   Pulmonary:      Effort: Respiratory distress present.      Breath sounds: Wheezing present.   Abdominal:      General: There is distension.   Musculoskeletal:         General: Normal range of motion.      Cervical back: Normal range of motion.   Skin:     General: Skin is warm and dry.   Neurological:      Mental Status: He is alert and oriented to person, place, and time.   Psychiatric:         Mood and Affect: Mood normal.         Review of Systems: Negative except as indicated in HPI    Significant Labs:  CBC/Anemia Profile:  Recent  Labs   Lab 04/22/24  1520 04/23/24  0529 04/24/24  0042 04/24/24  0558   WBC 17.33* 15.38*  --  18.89*   HGB 11.5* 10.4*  --  10.7*   HCT 34.5* 30.8*  --  31.4*    314  --  344   MCV 94 93  --  94   RDW 12.7 12.6  --  12.5   IRON  --  15*  --   --    FERRITIN  --  1,205*  --   --    RETIC  --  0.9  --   --    FOLATE  --  5.6  --   --    BMOGBHUU50  --  1164*  --   --    OCCULTBLOOD  --   --  Negative  --    Chemistries:  Recent Labs   Lab 04/22/24  1520 04/23/24  0529 04/24/24  0558   * 129* 128*   K 4.7 4.4 5.3*   CL 93* 94* 93*   CO2 24 24 28   BUN 19 19 20   CREATININE 0.8 0.8 0.6   CALCIUM 9.4 9.1 8.6*   ALBUMIN 3.0*  --   --    PROT 7.1  --   --    BILITOT 0.3  --   --    ALKPHOS 85  --   --    ALT 31  --   --    AST 28  --   --    MG  --  1.8 2.9*      PAST MEDICAL HISTORY:  Gastric ulcer

## 2024-12-14 NOTE — OB RN PATIENT PROFILE - FALL HARM RISK - UNIVERSAL INTERVENTIONS
Bed in lowest position, wheels locked, appropriate side rails in place/Call bell, personal items and telephone in reach/Instruct patient to call for assistance before getting out of bed or chair/Non-slip footwear when patient is out of bed/[place] to call system/Physically safe environment - no spills, clutter or unnecessary equipment/Purposeful Proactive Rounding/Room/bathroom lighting operational, light cord in reach

## 2024-12-14 NOTE — OB PROVIDER LABOR PROGRESS NOTE - NS_OBIHIFHRDETAILS_OBGYN_ALL_OB_FT
baseline 120bpm, moderate variability, +accels, -decels
Cat 1
baseline 130, moderate variability, +accels, -decels
130bpm, moderate variability, accels, no decels

## 2024-12-14 NOTE — OB PROVIDER LABOR PROGRESS NOTE - ASSESSMENT
Plan:  - VSS  - Cat 1 tracing  - Unchanged exam, to receive additional dose of cytotec  - Will continue to monitor  - Maternal/fetal status reassuring
- maternal VSS  - cat 1 tracing  - s/p bcyto #1 @ 8083  
A/P:    Cat I tracing   Stop Buccal cyto and start Pitocin 
A/p:    Cat I tracing  Placed on right lateral recumbent position since cervical lip is on the right  To re-assess within the hour

## 2024-12-14 NOTE — OB RN DELIVERY SUMMARY - NS_LABORCHARACTER_OBGYN_ALL_OB
External electronic FM Augmentation of labor/Febrile (>38C)/External electronic FM/Antibiotics in labor/Chorioamnionitis

## 2024-12-14 NOTE — OB PROVIDER H&P - NSLOWPPHRISK_OBGYN_A_OB
No previous uterine incision/Bah Pregnancy/Less than or equal to 4 previous vaginal births/No known bleeding disorder/No history of postpartum hemorrhage/No other PPH risks indicated

## 2024-12-14 NOTE — OB PROVIDER H&P - HISTORY OF PRESENT ILLNESS
30yo  at 39w1d by embryo transfer date (4/3/24) presents to L&D triage with complaints of leakage of fluid. She reports loss of fluid around . Denies vaginal bleeding, painful contractions, and endorses good fetal movement.    Pregnancy course significant for: IVF pregnancy    LMP: 3/15/24  LIU: 24  Embryo transfer: 4/3/24    POB:   PGYN: -fibroids, -cysts, denies STD hx, denies abnormal PAP smears  PMH: peptic ulcer  PSH: denies   Meds: PNVs  All: denies   SH: denies EtOH, tobacco and illicit drug use during this pregnancy; feels safe at home    Sono  vertex, anterior placenta  EFW  2452gm, 22% 30yo  at 39w1d by embryo transfer date (4/3/24) presents to L&D triage with complaints of leakage of fluid since ~945 pm. She reports loss of fluid around . Denies vaginal bleeding, painful contractions, and endorses good fetal movement.    Pregnancy course significant for: IVF pregnancy    LMP: 3/15/24  LIU: 24  Embryo transfer: 4/3/24    POB:   PGYN: -fibroids, -cysts, denies STD hx, denies abnormal PAP smears  PMH: peptic ulcer  PSH: denies   Meds: PNVs  All: denies   SH: denies EtOH, tobacco and illicit drug use during this pregnancy; feels safe at home    Sono  vertex, anterior placenta  EFW  2452gm, 22%

## 2024-12-14 NOTE — OB PROVIDER H&P - ASSESSMENT
A/P: 28yo  by embryo transfer date admitted to L&D for PROM    -Admit to L&D  -Consent  -Admission labs  -IV fluids  -Fetus: Cat I tracing. Continuous toco and fetal monitoring.   -GBS: Negative, no GBS ppx required   -Analgesia: epi PRN    Discussed with Dr. Conley A/P: 28yo  at 39w1d by embryo transfer date (4/3/24) presents to L&D triage with complaints of leakage of fluid since ~945pm, admin for PROM    -Admit to L&D  -Consent  -Admission labs  -IV fluids  -Fetus: Cat I tracing. Continuous toco and fetal monitoring.   -GBS: Negative, no GBS ppx required   -Analgesia: epi PRN    Discussed with Dr. Conley

## 2024-12-14 NOTE — OB PROVIDER H&P - NSHPPHYSICALEXAM_GEN_ALL_CORE
Vital Signs Last 24 Hrs  T(C): 36.8 (14 Dec 2024 01:47), Max: 36.8 (14 Dec 2024 00:19)  T(F): 98.24 (14 Dec 2024 01:47), Max: 98.24 (14 Dec 2024 00:27)  HR: 90 (14 Dec 2024 01:12) (83 - 90)  BP: 132/70 (14 Dec 2024 01:12) (125/75 - 132/76)  RR: 16 (14 Dec 2024 00:19) (16 - 16)    Parameters below as of 14 Dec 2024 00:19  Patient On (Oxygen Delivery Method): room air    Gen: NAD, well-appearing, AAOx3   Resp: breathing comfortably on RA  Abd: Soft, gravid, nontender  SSE: grossly ruptured, pooling of fluid  SVE:  1.5/50/-3  Bedside sono: vertex, anterior placenta  FHT: baseline 130bpm, moderate variability, +accels, -decels  Quinter: q 4-6 mins

## 2024-12-15 LAB
BASOPHILS # BLD AUTO: 0.02 K/UL — SIGNIFICANT CHANGE UP (ref 0–0.2)
BASOPHILS NFR BLD AUTO: 0.1 % — SIGNIFICANT CHANGE UP (ref 0–2)
EOSINOPHIL # BLD AUTO: 0.11 K/UL — SIGNIFICANT CHANGE UP (ref 0–0.5)
EOSINOPHIL NFR BLD AUTO: 0.8 % — SIGNIFICANT CHANGE UP (ref 0–6)
HCT VFR BLD CALC: 26.9 % — LOW (ref 34.5–45)
HGB BLD-MCNC: 9.4 G/DL — LOW (ref 11.5–15.5)
IMM GRANULOCYTES NFR BLD AUTO: 0.4 % — SIGNIFICANT CHANGE UP (ref 0–0.9)
LYMPHOCYTES # BLD AUTO: 1.47 K/UL — SIGNIFICANT CHANGE UP (ref 1–3.3)
LYMPHOCYTES # BLD AUTO: 11 % — LOW (ref 13–44)
MCHC RBC-ENTMCNC: 31.8 PG — SIGNIFICANT CHANGE UP (ref 27–34)
MCHC RBC-ENTMCNC: 34.9 G/DL — SIGNIFICANT CHANGE UP (ref 32–36)
MCV RBC AUTO: 90.9 FL — SIGNIFICANT CHANGE UP (ref 80–100)
MONOCYTES # BLD AUTO: 1 K/UL — HIGH (ref 0–0.9)
MONOCYTES NFR BLD AUTO: 7.5 % — SIGNIFICANT CHANGE UP (ref 2–14)
NEUTROPHILS # BLD AUTO: 10.69 K/UL — HIGH (ref 1.8–7.4)
NEUTROPHILS NFR BLD AUTO: 80.2 % — HIGH (ref 43–77)
PLATELET # BLD AUTO: 188 K/UL — SIGNIFICANT CHANGE UP (ref 150–400)
RBC # BLD: 2.96 M/UL — LOW (ref 3.8–5.2)
RBC # FLD: 12.5 % — SIGNIFICANT CHANGE UP (ref 10.3–14.5)
WBC # BLD: 13.34 K/UL — HIGH (ref 3.8–10.5)
WBC # FLD AUTO: 13.34 K/UL — HIGH (ref 3.8–10.5)

## 2024-12-15 PROCEDURE — 59400 OBSTETRICAL CARE: CPT

## 2024-12-15 RX ORDER — SIMETHICONE 125 MG
80 CAPSULE ORAL EVERY 4 HOURS
Refills: 0 | Status: DISCONTINUED | OUTPATIENT
Start: 2024-12-15 | End: 2024-12-17

## 2024-12-15 RX ORDER — BENZOCAINE 10 %
1 OINTMENT (GRAM) TOPICAL EVERY 6 HOURS
Refills: 0 | Status: DISCONTINUED | OUTPATIENT
Start: 2024-12-15 | End: 2024-12-17

## 2024-12-15 RX ORDER — PRAMOXINE HYDROCHLORIDE 1 MG/ML
1 LOTION TOPICAL EVERY 4 HOURS
Refills: 0 | Status: DISCONTINUED | OUTPATIENT
Start: 2024-12-15 | End: 2024-12-17

## 2024-12-15 RX ORDER — WITCH HAZEL 50 G/100ML
1 SOLUTION RECTAL EVERY 4 HOURS
Refills: 0 | Status: DISCONTINUED | OUTPATIENT
Start: 2024-12-15 | End: 2024-12-17

## 2024-12-15 RX ORDER — KETOROLAC TROMETHAMINE 30 MG/ML
30 INJECTION INTRAMUSCULAR; INTRAVENOUS ONCE
Refills: 0 | Status: DISCONTINUED | OUTPATIENT
Start: 2024-12-15 | End: 2024-12-15

## 2024-12-15 RX ORDER — ACETAMINOPHEN 500MG 500 MG/1
975 TABLET, COATED ORAL
Refills: 0 | Status: DISCONTINUED | OUTPATIENT
Start: 2024-12-15 | End: 2024-12-17

## 2024-12-15 RX ORDER — DIBUCAINE 1 %
1 OINTMENT (GRAM) TOPICAL EVERY 6 HOURS
Refills: 0 | Status: DISCONTINUED | OUTPATIENT
Start: 2024-12-15 | End: 2024-12-17

## 2024-12-15 RX ORDER — IBUPROFEN 200 MG
600 TABLET ORAL EVERY 6 HOURS
Refills: 0 | Status: COMPLETED | OUTPATIENT
Start: 2024-12-15 | End: 2025-11-13

## 2024-12-15 RX ORDER — SODIUM CHLORIDE 9 MG/ML
3 INJECTION, SOLUTION INTRAMUSCULAR; INTRAVENOUS; SUBCUTANEOUS EVERY 8 HOURS
Refills: 0 | Status: DISCONTINUED | OUTPATIENT
Start: 2024-12-15 | End: 2024-12-17

## 2024-12-15 RX ORDER — .BETA.-CAROTENE, SODIUM ACETATE, ASCORBIC ACID, CHOLECALCIFEROL, .ALPHA.-TOCOPHEROL ACETATE, DL-, THIAMINE MONONITRATE, RIBOFLAVIN, NIACINAMIDE, PYRIDOXINE HYDROCHLORIDE, FOLIC ACID, CYANOCOBALAMIN, CALCIUM CARBONATE, FERROUS FUMARATE, ZINC OXIDE AND CUPRIC OXIDE 2000; 2000; 120; 400; 22; 1.84; 3; 20; 10; 1; 12; 200; 27; 25; 2 [IU]/1; [IU]/1; MG/1; [IU]/1; MG/1; MG/1; MG/1; MG/1; MG/1; MG/1; UG/1; MG/1; MG/1; MG/1; MG/1
1 TABLET ORAL DAILY
Refills: 0 | Status: DISCONTINUED | OUTPATIENT
Start: 2024-12-15 | End: 2024-12-17

## 2024-12-15 RX ORDER — OXYCODONE HYDROCHLORIDE 30 MG/1
5 TABLET ORAL ONCE
Refills: 0 | Status: DISCONTINUED | OUTPATIENT
Start: 2024-12-15 | End: 2024-12-17

## 2024-12-15 RX ORDER — LANOLIN 72 %
1 OINTMENT (GRAM) TOPICAL EVERY 6 HOURS
Refills: 0 | Status: DISCONTINUED | OUTPATIENT
Start: 2024-12-15 | End: 2024-12-17

## 2024-12-15 RX ORDER — IBUPROFEN 200 MG
600 TABLET ORAL EVERY 6 HOURS
Refills: 0 | Status: DISCONTINUED | OUTPATIENT
Start: 2024-12-15 | End: 2024-12-17

## 2024-12-15 RX ORDER — HYDROCORTISONE BUTYRATE 0.1 %
1 CREAM (GRAM) TOPICAL EVERY 6 HOURS
Refills: 0 | Status: DISCONTINUED | OUTPATIENT
Start: 2024-12-15 | End: 2024-12-17

## 2024-12-15 RX ORDER — DIPHENHYDRAMINE HCL 25 MG
25 CAPSULE ORAL EVERY 6 HOURS
Refills: 0 | Status: DISCONTINUED | OUTPATIENT
Start: 2024-12-15 | End: 2024-12-17

## 2024-12-15 RX ORDER — OXYCODONE HYDROCHLORIDE 30 MG/1
5 TABLET ORAL
Refills: 0 | Status: DISCONTINUED | OUTPATIENT
Start: 2024-12-15 | End: 2024-12-17

## 2024-12-15 RX ORDER — TETANUS TOXOID, REDUCED DIPHTHERIA TOXOID AND ACELLULAR PERTUSSIS VACCINE, ADSORBED 5; 2.5; 8; 8; 2.5 [IU]/.5ML; [IU]/.5ML; UG/.5ML; UG/.5ML; UG/.5ML
0.5 SUSPENSION INTRAMUSCULAR ONCE
Refills: 0 | Status: DISCONTINUED | OUTPATIENT
Start: 2024-12-15 | End: 2024-12-17

## 2024-12-15 RX ADMIN — SODIUM CHLORIDE 3 MILLILITER(S): 9 INJECTION, SOLUTION INTRAMUSCULAR; INTRAVENOUS; SUBCUTANEOUS at 06:58

## 2024-12-15 RX ADMIN — ACETAMINOPHEN 500MG 975 MILLIGRAM(S): 500 TABLET, COATED ORAL at 08:25

## 2024-12-15 RX ADMIN — Medication 600 MILLIGRAM(S): at 17:18

## 2024-12-15 RX ADMIN — Medication 600 MILLIGRAM(S): at 06:37

## 2024-12-15 RX ADMIN — ACETAMINOPHEN 500MG 975 MILLIGRAM(S): 500 TABLET, COATED ORAL at 20:27

## 2024-12-15 RX ADMIN — KETOROLAC TROMETHAMINE 30 MILLIGRAM(S): 30 INJECTION INTRAMUSCULAR; INTRAVENOUS at 00:46

## 2024-12-15 RX ADMIN — Medication 600 MILLIGRAM(S): at 11:09

## 2024-12-15 RX ADMIN — Medication 167 MILLIUNIT(S)/MIN: at 03:34

## 2024-12-15 RX ADMIN — .BETA.-CAROTENE, SODIUM ACETATE, ASCORBIC ACID, CHOLECALCIFEROL, .ALPHA.-TOCOPHEROL ACETATE, DL-, THIAMINE MONONITRATE, RIBOFLAVIN, NIACINAMIDE, PYRIDOXINE HYDROCHLORIDE, FOLIC ACID, CYANOCOBALAMIN, CALCIUM CARBONATE, FERROUS FUMARATE, ZINC OXIDE AND CUPRIC OXIDE 1 TABLET(S): 2000; 2000; 120; 400; 22; 1.84; 3; 20; 10; 1; 12; 200; 27; 25; 2 TABLET ORAL at 11:09

## 2024-12-15 RX ADMIN — ACETAMINOPHEN 500MG 975 MILLIGRAM(S): 500 TABLET, COATED ORAL at 14:05

## 2024-12-15 RX ADMIN — ACETAMINOPHEN 500MG 975 MILLIGRAM(S): 500 TABLET, COATED ORAL at 04:38

## 2024-12-15 NOTE — DISCHARGE NOTE OB - MATERIALS PROVIDED
New Beginnings/Vaccinations/[facility] Screening Program/  Immunization Record/Breastfeeding Log/Breastfeeding Mother’s Support Group Information/Guide to Postpartum Care/[facility] Hearing Screen Program/Back To Sleep Handout/Shaken Baby Prevention Handout/Breastfeeding Guide and Packet/Birth Certificate Instructions

## 2024-12-15 NOTE — OB PROVIDER DELIVERY SUMMARY - NSSELHIDDEN_OBGYN_ALL_OB_FT
[NS_DeliveryAttending1_OBGYN_ALL_OB_FT:[id number]],[NS_DeliveryAssist1_OBGYN_ALL_OB_FT:[id number]]

## 2024-12-15 NOTE — DISCHARGE NOTE OB - CARE PLAN
Principal Discharge DX:	 (normal spontaneous vaginal delivery)  Assessment and plan of treatment:	Please call your provider to schedule postpartum visit in 6 weeks. Take medications as directed, regular diet, activity as tolerated. Exclusive breast feeding for the first 6 months is recommended. Nothing per vagina for 6 weeks (incl. sex, douching, etc). If you have additional concerns, please inform your provider.   1 Principal Discharge DX:	 (normal spontaneous vaginal delivery)  Assessment and plan of treatment:	Please call your provider to schedule postpartum visit in 6 weeks. Take medications as directed, regular diet, activity as tolerated. Exclusive breast feeding for the first 6 months is recommended. Nothing per vagina for 6 weeks (incl. sex, douching, etc). If you have additional concerns, please inform your provider.  Secondary Diagnosis:	Anemia due to acute blood loss  Assessment and plan of treatment:	Please call your doctor or come to the ED if you experience heavy bleeding, lightheadedness/dizziness, chest pain, difficulty breathing, syncope.

## 2024-12-15 NOTE — OB PROVIDER DELIVERY SUMMARY - NSPROVIDERDELIVERYNOTE_OBGYN_ALL_OB_FT
at 00:24 AM of a live male, 3000g and Apgars 9/9. Delivered SUHAIL, no nuchal cord, clear fluid. Infant's head delivered with maternal expulsive efforts. Shoulders delivered without difficulty followed by the rest of the body. Nose and mouth were bulb suctioned. Cord clamped and cut after delay. Samples obtained. Baby handed to patient. Placenta delivered spontaneously, intact, 3VC. Fundus firm, minimal bleeding. Perineum and vagina inspected – deep 2nd degree perineal laceration repaired with 2.0 Chromic. EBL 265cc. Hemostasis noted. Pt tolerated procedure well, in stable condition, recovering in LDR. Infant in LDR. Instrument/sponge count correct x 2 and confirmed by nurse.  at 00:24 AM of a live male, 3000g and Apgars 9/9. Delivered SUHAIL, nuchal cord x1, clear fluid. Infant's head delivered with maternal expulsive efforts. Nuchal cordx1 was manually reduced. Shoulders delivered without difficulty followed by the rest of the body. Nose and mouth were bulb suctioned. Cord clamped and cut after delay. Samples obtained. Baby handed to patient. Placenta delivered spontaneously, intact, 3VC. Fundus firm, minimal bleeding. Perineum and vagina inspected – deep 2nd degree perineal laceration repaired with 2.0 Chromic. EBL 265cc. Hemostasis noted. Patient tolerated procedure well, in stable condition, recovering in LDR. Infant in LDR. Instrument/sponge count correct x 2 and confirmed by nurse.

## 2024-12-15 NOTE — DISCHARGE NOTE OB - PLAN OF CARE
Please call your provider to schedule postpartum visit in 6 weeks. Take medications as directed, regular diet, activity as tolerated. Exclusive breast feeding for the first 6 months is recommended. Nothing per vagina for 6 weeks (incl. sex, douching, etc). If you have additional concerns, please inform your provider. Please call your doctor or come to the ED if you experience heavy bleeding, lightheadedness/dizziness, chest pain, difficulty breathing, syncope.

## 2024-12-15 NOTE — DISCHARGE NOTE OB - PATIENT PORTAL LINK FT
You can access the FollowMyHealth Patient Portal offered by [facility] by registering at the following website: [email or web address]. By joining [facility]’s FollowMyHealth portal, you will also be able to view your health information using other applications (apps) compatible with our system.

## 2024-12-15 NOTE — DISCHARGE NOTE OB - CARE PROVIDER_API CALL
Rimpel, Katherinne  Obstetrics and Gynecology  [address] [place] [address]  Phone: [phone]  Fax: [phone]  Follow Up Time:

## 2024-12-15 NOTE — OB PROVIDER DELIVERY SUMMARY - NSLOWPPHRISK_OBGYN_A_OB
No previous uterine incision/Bah Pregnancy/Less than or equal to 4 previous vaginal births/No known bleeding disorder/No other PPH risks indicated

## 2024-12-15 NOTE — DISCHARGE NOTE OB - CARE PROVIDERS DIRECT ADDRESSES
[email or web address]

## 2024-12-15 NOTE — DISCHARGE NOTE OB - HOSPITAL COURSE
Patient underwent a normal spontaneous vaginal delivery complicated by chorioamnionitis treated with ampicillin, gentamicin and ofirmev. Post-partum course was uncomplicated. Pain is well controlled with PRN medication. She has no difficulty with ambulation, voiding, or PO intake. Lab values and vital signs are stable prior to discharge.

## 2024-12-15 NOTE — DISCHARGE NOTE OB - FINANCIAL ASSISTANCE
[facility] provides services at a reduced cost to those who are determined to be eligible through [facility]’s financial assistance program. Information regarding [facility]’s financial assistance program can be found by going to [email or web address] or by calling [phone].

## 2024-12-16 RX ORDER — .BETA.-CAROTENE, SODIUM ACETATE, ASCORBIC ACID, CHOLECALCIFEROL, .ALPHA.-TOCOPHEROL ACETATE, DL-, THIAMINE MONONITRATE, RIBOFLAVIN, NIACINAMIDE, PYRIDOXINE HYDROCHLORIDE, FOLIC ACID, CYANOCOBALAMIN, CALCIUM CARBONATE, FERROUS FUMARATE, ZINC OXIDE AND CUPRIC OXIDE 2000; 2000; 120; 400; 22; 1.84; 3; 20; 10; 1; 12; 200; 27; 25; 2 [IU]/1; [IU]/1; MG/1; [IU]/1; MG/1; MG/1; MG/1; MG/1; MG/1; MG/1; UG/1; MG/1; MG/1; MG/1; MG/1
1 TABLET ORAL
Refills: 0 | DISCHARGE

## 2024-12-16 RX ORDER — IBUPROFEN 200 MG
1 TABLET ORAL
Qty: 28 | Refills: 0
Start: 2024-12-16 | End: 2024-12-22

## 2024-12-16 RX ORDER — ACETAMINOPHEN 500MG 500 MG/1
3 TABLET, COATED ORAL
Qty: 84 | Refills: 0
Start: 2024-12-16 | End: 2024-12-22

## 2024-12-16 RX ADMIN — Medication 1 APPLICATION(S): at 15:44

## 2024-12-16 RX ADMIN — Medication 600 MILLIGRAM(S): at 00:05

## 2024-12-16 RX ADMIN — ACETAMINOPHEN 500MG 975 MILLIGRAM(S): 500 TABLET, COATED ORAL at 02:11

## 2024-12-16 RX ADMIN — ACETAMINOPHEN 500MG 975 MILLIGRAM(S): 500 TABLET, COATED ORAL at 15:44

## 2024-12-16 RX ADMIN — Medication 600 MILLIGRAM(S): at 06:50

## 2024-12-16 RX ADMIN — .BETA.-CAROTENE, SODIUM ACETATE, ASCORBIC ACID, CHOLECALCIFEROL, .ALPHA.-TOCOPHEROL ACETATE, DL-, THIAMINE MONONITRATE, RIBOFLAVIN, NIACINAMIDE, PYRIDOXINE HYDROCHLORIDE, FOLIC ACID, CYANOCOBALAMIN, CALCIUM CARBONATE, FERROUS FUMARATE, ZINC OXIDE AND CUPRIC OXIDE 1 TABLET(S): 2000; 2000; 120; 400; 22; 1.84; 3; 20; 10; 1; 12; 200; 27; 25; 2 TABLET ORAL at 12:53

## 2024-12-16 RX ADMIN — ACETAMINOPHEN 500MG 975 MILLIGRAM(S): 500 TABLET, COATED ORAL at 09:00

## 2024-12-16 RX ADMIN — Medication 600 MILLIGRAM(S): at 23:29

## 2024-12-16 RX ADMIN — Medication 600 MILLIGRAM(S): at 12:52

## 2024-12-16 RX ADMIN — Medication 600 MILLIGRAM(S): at 17:58

## 2024-12-16 RX ADMIN — ACETAMINOPHEN 500MG 975 MILLIGRAM(S): 500 TABLET, COATED ORAL at 20:34

## 2024-12-16 NOTE — PROGRESS NOTE ADULT - SUBJECTIVE AND OBJECTIVE BOX
RENNY BEAVER is a 29y  now PPD#1 s/p spontaneous vaginal delivery at 39w2d gestation, complicated by chorioamnionitis s/p A/G/O.    S:    No acute events overnight.   The patient has no complaints.  Pain controlled with current treatment regimen.   She is ambulating without difficulty and tolerating PO.   + flatus/-BM/+ voiding   She endorses appropriate lochia, which is decreasing.   She is breastfeeding.  She denies fevers, chills, nausea and vomiting.   She denies headache, chest pain and SOB.     O:    T(C): 36.4 (24 @ 04:00), Max: 36.7 (12-15-24 @ 15:41)  HR: 74 (24 @ 04:00) (74 - 83)  BP: 103/68 (-16-24 @ 04:00) (100/60 - 103/68)  RR: 18 (24 @ 04:00) (18 - 18)  SpO2: 98% (24 @ 04:00) (97% - 98%)    Gen: NAD, AOx3  Pulm: Normal respiratory effort on RA  Abdomen:  Soft, non-tender, non-distended  Uterus:  Fundus firm below umbilicus  Ext:  Non-tender and non-edematous                          9.4    13.34 )-----------( 188      ( 15 Dec 2024 13:30 )             26.9              RENNY BEAVER is a 29y  now PPD#1 s/p spontaneous vaginal delivery at 39w2d gestation, complicated by chorioamnionitis s/p A/G/O.    S:    No acute events overnight.   The patient has no complaints.  Pain controlled with current treatment regimen.   She is ambulating without difficulty and tolerating PO.   + flatus/+BM/+ voiding   She endorses appropriate lochia, which is decreasing.   She is breastfeeding.  She denies fevers, chills, nausea and vomiting.   She denies headache, chest pain and SOB.     O:    T(C): 36.4 (24 @ 04:00), Max: 36.7 (12-15-24 @ 15:41)  HR: 74 (24 @ 04:00) (74 - 83)  BP: 103/68 (-16-24 @ 04:00) (100/60 - 103/68)  RR: 18 (24 @ 04:00) (18 - 18)  SpO2: 98% (24 @ 04:00) (97% - 98%)    Gen: NAD, AOx3  Pulm: Normal respiratory effort on RA  Abdomen:  Soft, non-tender, non-distended  Uterus:  Fundus firm below umbilicus  Ext:  Non-tender and non-edematous                          9.4    13.34 )-----------( 188      ( 15 Dec 2024 13:30 )             26.9

## 2024-12-17 VITALS
DIASTOLIC BLOOD PRESSURE: 73 MMHG | HEART RATE: 73 BPM | TEMPERATURE: 98 F | SYSTOLIC BLOOD PRESSURE: 114 MMHG | OXYGEN SATURATION: 99 % | RESPIRATION RATE: 18 BRPM

## 2024-12-17 PROCEDURE — 59050 FETAL MONITOR W/REPORT: CPT

## 2024-12-17 PROCEDURE — 86900 BLOOD TYPING SEROLOGIC ABO: CPT

## 2024-12-17 PROCEDURE — 86850 RBC ANTIBODY SCREEN: CPT

## 2024-12-17 PROCEDURE — 36415 COLL VENOUS BLD VENIPUNCTURE: CPT

## 2024-12-17 PROCEDURE — 93970 EXTREMITY STUDY: CPT | Mod: 26

## 2024-12-17 PROCEDURE — 85025 COMPLETE CBC W/AUTO DIFF WBC: CPT

## 2024-12-17 PROCEDURE — 93970 EXTREMITY STUDY: CPT

## 2024-12-17 PROCEDURE — 86901 BLOOD TYPING SEROLOGIC RH(D): CPT

## 2024-12-17 PROCEDURE — 86780 TREPONEMA PALLIDUM: CPT

## 2024-12-17 RX ADMIN — ACETAMINOPHEN 500MG 975 MILLIGRAM(S): 500 TABLET, COATED ORAL at 02:23

## 2024-12-17 RX ADMIN — .BETA.-CAROTENE, SODIUM ACETATE, ASCORBIC ACID, CHOLECALCIFEROL, .ALPHA.-TOCOPHEROL ACETATE, DL-, THIAMINE MONONITRATE, RIBOFLAVIN, NIACINAMIDE, PYRIDOXINE HYDROCHLORIDE, FOLIC ACID, CYANOCOBALAMIN, CALCIUM CARBONATE, FERROUS FUMARATE, ZINC OXIDE AND CUPRIC OXIDE 1 TABLET(S): 2000; 2000; 120; 400; 22; 1.84; 3; 20; 10; 1; 12; 200; 27; 25; 2 TABLET ORAL at 11:44

## 2024-12-17 RX ADMIN — Medication 600 MILLIGRAM(S): at 05:34

## 2024-12-17 RX ADMIN — ACETAMINOPHEN 500MG 975 MILLIGRAM(S): 500 TABLET, COATED ORAL at 08:43

## 2024-12-17 RX ADMIN — Medication 600 MILLIGRAM(S): at 11:44

## 2024-12-17 NOTE — PROGRESS NOTE ADULT - ASSESSMENT
A/P:  RENNY BEAVER is a 29y  now PPD#2 s/p spontaneous vaginal delivery at 39w2d gestation, complicated by chorioamnionitis s/p A/G/O.    -Vital signs stable  -Hgb: 11.8 > 9.4  -Voiding, tolerating PO, bowel function nml   -Advance care as tolerated   -Continue routine postpartum care and education  -Healthy male infant, circumcision done  -Dispo: Patient is meeting all postpartum milestones. Possible discharge today pending attending approval.    
INTERVAL HPI/OVERNIGHT EVENTS:  29y Female s/p labor epidural on 12/14/24    Vital Signs Last 24 Hrs  T(C): 36.7 (15 Dec 2024 15:41), Max: 37.2 (15 Dec 2024 03:30)  T(F): 98 (15 Dec 2024 15:41), Max: 98.9 (15 Dec 2024 03:30)  HR: 83 (15 Dec 2024 15:41) (83 - 115)  BP: 100/60 (15 Dec 2024 15:41) (100/60 - 135/62)  BP(mean): --  RR: 18 (15 Dec 2024 15:41) (17 - 18)  SpO2: 97% (15 Dec 2024 15:41) (97% - 98%)    Parameters below as of 15 Dec 2024 15:41  Patient On (Oxygen Delivery Method): room air            Patient's overall anesthesia satisfaction: Positive    Patient doing well     No headache      No residual numbness or weakness, sensory and motor function intact    Ambulating without assistance, + void    No anesthetic complications or complaints noted or reported                 
A/P:  RENNY BEAVER is a 29y  now PPD#1 s/p spontaneous vaginal delivery at 39w2d gestation, complicated by chorioamnionitis s/p A/G/O.    -Vital signs stable  -Hgb: 9.4 -> AM labs pending   -Voiding, tolerating PO, bowel function nml   -Advance care as tolerated   -Continue routine postpartum care and education  -Healthy male infant, desires circumcision  -Dispo: Patient to be discharged when meeting all postpartum milestones and pending attending approval.

## 2024-12-17 NOTE — PROGRESS NOTE ADULT - SUBJECTIVE AND OBJECTIVE BOX
RENNY BEAVER is a 29y  now PPD#2 s/p spontaneous vaginal delivery at 39w2d gestation, complicated by chorioamnionitis s/p A/G/O.    S:    No acute events overnight.   The patient has no complaints.  Pain controlled with current treatment regimen.   She is ambulating without difficulty and tolerating PO.   + flatus/+BM/+ voiding   She endorses appropriate lochia, which is decreasing.   She is breastfeeding.  She denies fevers, chills, nausea and vomiting.   She denies headache, chest pain and SOB.     O:    T(C): 36.4 (24 @ 04:00), Max: 36.7 (12-15-24 @ 15:41)  HR: 74 (24 @ 04:00) (74 - 83)  BP: 103/68 (-16-24 @ 04:00) (100/60 - 103/68)  RR: 18 (24 @ 04:00) (18 - 18)  SpO2: 98% (24 @ 04:00) (97% - 98%)    Gen: NAD, AOx3  Pulm: Normal respiratory effort on RA  Abdomen:  Soft, non-tender, non-distended  Uterus:  Fundus firm below umbilicus  Ext:  Non-tender and non-edematous                          9.4    13.34 )-----------( 188      ( 15 Dec 2024 13:30 )             26.9

## 2024-12-20 ENCOUNTER — APPOINTMENT (OUTPATIENT)
Dept: OBGYN | Facility: HOSPITAL | Age: 29
End: 2024-12-20

## 2025-01-27 ENCOUNTER — APPOINTMENT (OUTPATIENT)
Dept: OBGYN | Facility: CLINIC | Age: 30
End: 2025-01-27
Payer: COMMERCIAL

## 2025-01-27 VITALS
SYSTOLIC BLOOD PRESSURE: 120 MMHG | BODY MASS INDEX: 29.49 KG/M2 | DIASTOLIC BLOOD PRESSURE: 76 MMHG | WEIGHT: 177 LBS | HEIGHT: 65 IN

## 2025-01-27 PROCEDURE — 0503F POSTPARTUM CARE VISIT: CPT

## 2025-01-28 ENCOUNTER — NON-APPOINTMENT (OUTPATIENT)
Age: 30
End: 2025-01-28

## 2025-01-28 LAB
C TRACH RRNA SPEC QL NAA+PROBE: NOT DETECTED
HPV HIGH+LOW RISK DNA PNL CVX: NOT DETECTED
N GONORRHOEA RRNA SPEC QL NAA+PROBE: NOT DETECTED
SOURCE TP AMPLIFICATION: NORMAL

## 2025-01-30 LAB — CYTOLOGY CVX/VAG DOC THIN PREP: NORMAL

## 2025-04-11 ENCOUNTER — APPOINTMENT (OUTPATIENT)
Dept: ULTRASOUND IMAGING | Facility: CLINIC | Age: 30
End: 2025-04-11